# Patient Record
Sex: FEMALE | Race: WHITE | NOT HISPANIC OR LATINO | Employment: FULL TIME | URBAN - METROPOLITAN AREA
[De-identification: names, ages, dates, MRNs, and addresses within clinical notes are randomized per-mention and may not be internally consistent; named-entity substitution may affect disease eponyms.]

---

## 2017-01-10 ENCOUNTER — HOSPITAL ENCOUNTER (EMERGENCY)
Facility: MEDICAL CENTER | Age: 27
End: 2017-01-10

## 2017-01-10 VITALS
HEART RATE: 103 BPM | HEIGHT: 71 IN | WEIGHT: 149.91 LBS | DIASTOLIC BLOOD PRESSURE: 63 MMHG | RESPIRATION RATE: 16 BRPM | BODY MASS INDEX: 20.99 KG/M2 | SYSTOLIC BLOOD PRESSURE: 123 MMHG | TEMPERATURE: 99 F | OXYGEN SATURATION: 100 %

## 2017-01-10 LAB
APPEARANCE UR: CLEAR
BILIRUB UR QL STRIP.AUTO: NEGATIVE
COLOR UR: ABNORMAL
EPI CELLS #/AREA URNS HPF: NORMAL /HPF
GLUCOSE UR STRIP.AUTO-MCNC: NEGATIVE MG/DL
HCG UR QL: NEGATIVE
KETONES UR STRIP.AUTO-MCNC: NEGATIVE MG/DL
LEUKOCYTE ESTERASE UR QL STRIP.AUTO: NEGATIVE
MICRO URNS: ABNORMAL
MUCOUS THREADS #/AREA URNS HPF: NORMAL /HPF
NITRITE UR QL STRIP.AUTO: NEGATIVE
PH UR STRIP.AUTO: <5 [PH]
PROT UR QL STRIP: NEGATIVE MG/DL
RBC # URNS HPF: NORMAL /HPF
RBC UR QL AUTO: ABNORMAL
SP GR UR REFRACTOMETRY: 1.01
SP GR UR STRIP.AUTO: 1.01

## 2017-01-10 PROCEDURE — 81001 URINALYSIS AUTO W/SCOPE: CPT

## 2017-01-10 PROCEDURE — 302449 STATCHG TRIAGE ONLY (STATISTIC)

## 2017-01-10 PROCEDURE — 81025 URINE PREGNANCY TEST: CPT

## 2017-01-10 ASSESSMENT — PAIN SCALES - GENERAL: PAINLEVEL_OUTOF10: 6

## 2017-01-11 NOTE — ED NOTES
"Chief Complaint   Patient presents with   • Vaginal Bleeding     LMP 11/28/16. Negative pregnancy test 3-4 days ago. Pt states \"I think I'm having a miscarriage. I started cramping today and having bleeding, I was shaking, I had to go home from work. Pt states \"I passed a clot.\" Pt never had a confirmed pregnancy.   • Cramping     Pt ambulatory to triage with above complaints. Slightly tachycardic, otherwise VSS. Pt given urine cup for sample. Educated on triage process, placed in lobby, instructed to notify staff of any issues.    /63 mmHg  Pulse 103  Temp(Src) 37.2 °C (99 °F) (Temporal)  Resp 16  Ht 1.803 m (5' 11\")  Wt 68 kg (149 lb 14.6 oz)  BMI 20.92 kg/m2  SpO2 100%    "

## 2017-09-14 ENCOUNTER — OFFICE VISIT (OUTPATIENT)
Dept: MEDICAL GROUP | Facility: MEDICAL CENTER | Age: 27
End: 2017-09-14
Payer: COMMERCIAL

## 2017-09-14 VITALS
HEART RATE: 100 BPM | DIASTOLIC BLOOD PRESSURE: 60 MMHG | RESPIRATION RATE: 16 BRPM | OXYGEN SATURATION: 98 % | BODY MASS INDEX: 19.6 KG/M2 | TEMPERATURE: 98.4 F | HEIGHT: 71 IN | WEIGHT: 140 LBS | SYSTOLIC BLOOD PRESSURE: 106 MMHG

## 2017-09-14 DIAGNOSIS — Z00.00 WELLNESS EXAMINATION: ICD-10-CM

## 2017-09-14 DIAGNOSIS — Z34.90 PREGNANCY, UNSPECIFIED GESTATIONAL AGE: ICD-10-CM

## 2017-09-14 PROCEDURE — 99385 PREV VISIT NEW AGE 18-39: CPT | Performed by: PHYSICIAN ASSISTANT

## 2017-09-14 ASSESSMENT — PATIENT HEALTH QUESTIONNAIRE - PHQ9: CLINICAL INTERPRETATION OF PHQ2 SCORE: 0

## 2017-09-14 NOTE — ASSESSMENT & PLAN NOTE
Positive home pregnancy test on 7/28, LMP 6/15, estimates she is about 12 weeks pregnant. Hasn't seen OB yet. No prior pregnancy. Having nausea, vomiting and fatigue. Trying to take daily MVI. Not taking any meds - was on singulair, neurontin, flonase - stopped when she found out she was pregnant. . She and  moved from [place] to [place] last December. 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 No drugs. Doesn't drink alcohol. Doesn't smoke. Has hx of HSV but no other hx of STD. No known diabetes, heart disease, immune disease.

## 2017-09-14 NOTE — PROGRESS NOTES
"Chief Complaint   Patient presents with   • Establish Care   • Routine Prenatal Visit     labs, pos home test on 7/28       HISTORY OF THE PRESENT ILLNESS: This is a 26 y.o. female new patient to our practice. This pleasant patient is here today to establish care and have general \"check up\".    Pregnancy  Positive home pregnancy test on 7/28, LMP 6/15, estimates she is about 12 weeks pregnant. Hasn't seen OB yet. No prior pregnancy. Having nausea, vomiting and fatigue. Trying to take daily MVI. Not taking any meds - was on singulair, neurontin, flonase - stopped when she found out she was pregnant. . She and  moved from [place] to [place] last December. 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 No drugs. Doesn't drink alcohol. Doesn't smoke. Has hx of HSV but no other hx of STD. No known diabetes, heart disease, immune disease.      Past Medical History:   Diagnosis Date   • Herpes        No past surgical history on file.    No family status information on file.   History reviewed. No pertinent family history.    Social History   Substance Use Topics   • Smoking status: Never Smoker   • Smokeless tobacco: Never Used   • Alcohol use No       Allergies: Ciprofloxacin; Morphine; Sulfa drugs; Sulfamethoxazole w-trimethoprim; Sulfate; Sulfites; Ventolin [albuterol]; and Amoxicillin    No current [facility]-ordered outpatient prescriptions on file.     No current [facility]-ordered facility-administered medications on file.        Review of Systems   Constitutional: Negative for fever, chills,    HENT: Negative for ear pain, nosebleeds, congestion, sore throat and neck pain.    Eyes: Negative for blurred vision.   Respiratory: Negative for cough, sputum production, shortness of breath and wheezing.    Cardiovascular: Negative for chest pain, palpitations, orthopnea and leg swelling.   Genitourinary: Negative for dysuria, urgency and frequency. " "  Musculoskeletal: Negative for myalgias, back pain and joint pain.   Skin: Negative for rash and itching.   Neurological: Negative for dizziness, tingling, tremors, sensory change, focal weakness and headaches.   Endo/Heme/Allergies: Does not bruise/bleed easily.   Psychiatric/Behavioral: Negative for depression, anxiety, or memory loss.     All other systems reviewed and are negative except as in HPI.    Exam: Blood pressure 106/60, pulse 100, temperature 36.9 °C (98.4 °F), resp. rate 16, height 1.803 m (5' 11\"), weight 63.5 kg (140 lb), SpO2 98 %.  General: Normal appearing. No distress.  Neck: Supple without JVD or bruit. Thyroid is not enlarged.  Pulmonary: Clear to ausculation.  Normal effort. No rales, ronchi, or wheezing.  Cardiovascular: Regular rate and rhythm without murmur. Carotid and radial pulses are intact and equal bilaterally.  Neurologic: Grossly nonfocal  Lymph: No cervical, supraclavicular lymph nodes are palpable  Skin: Warm and dry.  No obvious lesions.  Musculoskeletal: Normal gait. No extremity cyanosis, clubbing, or edema.  Psych: Normal mood and affect. Alert and oriented x3. Judgment and insight is normal.    Assessment/Plan  1. Pregnancy, unspecified gestational age  HCG QUANTITATIVE   2. Wellness examination  CBC WITH DIFFERENTIAL    COMP METABOLIC PANEL    TSH WITH REFLEX TO FT4    HIV ANTIBODIES    RPR    URINALYSIS     Will f/u with OB. F/u prn  "

## 2017-10-09 ENCOUNTER — HOSPITAL ENCOUNTER (OUTPATIENT)
Facility: MEDICAL CENTER | Age: 27
End: 2017-10-09
Attending: OBSTETRICS & GYNECOLOGY
Payer: COMMERCIAL

## 2017-10-09 ENCOUNTER — GYNECOLOGY VISIT (OUTPATIENT)
Dept: OBGYN | Facility: CLINIC | Age: 27
End: 2017-10-09
Payer: COMMERCIAL

## 2017-10-09 VITALS
SYSTOLIC BLOOD PRESSURE: 104 MMHG | HEIGHT: 71 IN | WEIGHT: 142 LBS | BODY MASS INDEX: 19.88 KG/M2 | DIASTOLIC BLOOD PRESSURE: 64 MMHG

## 2017-10-09 DIAGNOSIS — Z34.90 PREGNANCY, UNSPECIFIED GESTATIONAL AGE: ICD-10-CM

## 2017-10-09 DIAGNOSIS — N93.8 DUB (DYSFUNCTIONAL UTERINE BLEEDING): ICD-10-CM

## 2017-10-09 LAB — IN CLINIC OB SCAN: NORMAL

## 2017-10-09 PROCEDURE — 88175 CYTOPATH C/V AUTO FLUID REDO: CPT

## 2017-10-09 PROCEDURE — 87491 CHLMYD TRACH DNA AMP PROBE: CPT

## 2017-10-09 PROCEDURE — 87591 N.GONORRHOEAE DNA AMP PROB: CPT

## 2017-10-09 PROCEDURE — 76830 TRANSVAGINAL US NON-OB: CPT | Performed by: OBSTETRICS & GYNECOLOGY

## 2017-10-09 PROCEDURE — 99203 OFFICE O/P NEW LOW 30 MIN: CPT | Mod: 25 | Performed by: OBSTETRICS & GYNECOLOGY

## 2017-10-09 RX ORDER — MONTELUKAST SODIUM 10 MG/1
10 TABLET ORAL DAILY
COMMUNITY
End: 2018-07-05

## 2017-10-09 RX ORDER — MONTELUKAST SODIUM 10 MG/1
10 TABLET ORAL DAILY
Qty: 30 TAB | Refills: 12 | Status: SHIPPED | OUTPATIENT
Start: 2017-10-09 | End: 2019-03-14

## 2017-10-09 NOTE — PROGRESS NOTES
"Bobbi Douglas,  26 y.o.  female presents today with a C/O of :amenorrhea. Pt   Patient's last menstrual period was 06/15/2017.       Subjective : Nausea/Vomiting: Sometimes:  Abdominal /pelvic cramping : No :   vaginal bleeding:No  Complaining of sinus congestion and allergies       GYN ROS:  normal menses, no abnormal bleeding, pelvic pain or discharge, no breast pain or new or enlarging lumps on self exam      Past Medical History:   Diagnosis Date   • Herpes        No past surgical history on file.    Current Birth control:  none    OB History    Para Term  AB Living   1             SAB TAB Ectopic Molar Multiple Live Births                    # Outcome Date GA Lbr Nathen/2nd Weight Sex Delivery Anes PTL Lv   1 Current                       Allergy:      Ciprofloxacin; Morphine; Sulfa drugs; Sulfamethoxazole w-trimethoprim; Sulfate; Sulfites; Ventolin [albuterol]; and Amoxicillin    Exam;    /64   Ht 1.803 m (5' 11\")   Wt 64.4 kg (142 lb)   LMP 06/15/2017   Breastfeeding? No   BMI 19.80 kg/m²   Well-developed well-nourished female in no apparent distress  Heart regular rate and rhythm  Lungs clear to auscultation bilaterally  Breasts bilaterally normal with no dominant masses  Abdomen is soft and nontender    Normal external female genitalia  Cervix is closed thick and high  Uterus  16 centimeters  Adnexa are bilaterally nontender with no dominant masses    Lab.    No results found for this or any previous visit (from the past 336 hour(s)).  Ultrasound :     Second/third trimester findings: BPD: consistent with 15 weeks and 5 days, Placenta localization: anterior and US GUERA: 3/28/18  Probe type: abdominal  Ultrasound was performed and read by me      Assessment:    Intrauterine gestation at 16 weeks and 4 /7 days, with EDC 3/22/18    Plan:  4 weeks  Prenatal labs are ordered  Quad screen and us      "

## 2017-10-09 NOTE — LETTER
October 9, 2017       Patient: Bobbi Douglas   YOB: 1990   Date of Visit: 10/9/2017         To Whom It May Concern:    It is my medical opinion that Bobbi Douglas has an expected delivery date of 3/22/2018.    If you have any questions or concerns, please don't hesitate to call [phone]          Sincerely,          Bibi Weston M.D.  Electronically Signed

## 2017-10-10 LAB
C TRACH DNA GENITAL QL NAA+PROBE: NEGATIVE
CYTOLOGY REG CYTOL: NORMAL
N GONORRHOEA DNA GENITAL QL NAA+PROBE: NEGATIVE
SPECIMEN SOURCE: NORMAL

## 2017-10-28 ENCOUNTER — HOSPITAL ENCOUNTER (OUTPATIENT)
Dept: LAB | Facility: MEDICAL CENTER | Age: 27
End: 2017-10-28
Attending: OBSTETRICS & GYNECOLOGY
Payer: COMMERCIAL

## 2017-10-28 DIAGNOSIS — Z34.90 PREGNANCY, UNSPECIFIED GESTATIONAL AGE: ICD-10-CM

## 2017-10-28 LAB
ABO GROUP BLD: NORMAL
APPEARANCE UR: ABNORMAL
BACTERIA #/AREA URNS HPF: ABNORMAL /HPF
BASOPHILS # BLD AUTO: 0.7 % (ref 0–1.8)
BASOPHILS # BLD: 0.06 K/UL (ref 0–0.12)
BILIRUB UR QL STRIP.AUTO: NEGATIVE
BLD GP AB SCN SERPL QL: NORMAL
COLOR UR: YELLOW
CULTURE IF INDICATED INDCX: YES UA CULTURE
EOSINOPHIL # BLD AUTO: 0.12 K/UL (ref 0–0.51)
EOSINOPHIL NFR BLD: 1.5 % (ref 0–6.9)
EPI CELLS #/AREA URNS HPF: ABNORMAL /HPF
ERYTHROCYTE [DISTWIDTH] IN BLOOD BY AUTOMATED COUNT: 39.4 FL (ref 35.9–50)
GLUCOSE UR STRIP.AUTO-MCNC: NEGATIVE MG/DL
HBV SURFACE AG SER QL: NEGATIVE
HCT VFR BLD AUTO: 35.7 % (ref 37–47)
HGB BLD-MCNC: 12.3 G/DL (ref 12–16)
HIV 1+2 AB+HIV1 P24 AG SERPL QL IA: NON REACTIVE
HYALINE CASTS #/AREA URNS LPF: ABNORMAL /LPF
IMM GRANULOCYTES # BLD AUTO: 0.02 K/UL (ref 0–0.11)
IMM GRANULOCYTES NFR BLD AUTO: 0.2 % (ref 0–0.9)
KETONES UR STRIP.AUTO-MCNC: NEGATIVE MG/DL
LEUKOCYTE ESTERASE UR QL STRIP.AUTO: ABNORMAL
LYMPHOCYTES # BLD AUTO: 1.84 K/UL (ref 1–4.8)
LYMPHOCYTES NFR BLD: 22.7 % (ref 22–41)
MCH RBC QN AUTO: 30.8 PG (ref 27–33)
MCHC RBC AUTO-ENTMCNC: 34.5 G/DL (ref 33.6–35)
MCV RBC AUTO: 89.3 FL (ref 81.4–97.8)
MICRO URNS: ABNORMAL
MONOCYTES # BLD AUTO: 0.37 K/UL (ref 0–0.85)
MONOCYTES NFR BLD AUTO: 4.6 % (ref 0–13.4)
NEUTROPHILS # BLD AUTO: 5.71 K/UL (ref 2–7.15)
NEUTROPHILS NFR BLD: 70.3 % (ref 44–72)
NITRITE UR QL STRIP.AUTO: NEGATIVE
NRBC # BLD AUTO: 0 K/UL
NRBC BLD AUTO-RTO: 0 /100 WBC
PH UR STRIP.AUTO: 5.5 [PH]
PLATELET # BLD AUTO: 194 K/UL (ref 164–446)
PMV BLD AUTO: 11.3 FL (ref 9–12.9)
PROT UR QL STRIP: NEGATIVE MG/DL
RBC # BLD AUTO: 4 M/UL (ref 4.2–5.4)
RBC # URNS HPF: ABNORMAL /HPF
RBC UR QL AUTO: NEGATIVE
RH BLD: NORMAL
RUBV AB SER QL: 172.1 IU/ML
SP GR UR STRIP.AUTO: 1.02
TREPONEMA PALLIDUM IGG+IGM AB [PRESENCE] IN SERUM OR PLASMA BY IMMUNOASSAY: NON REACTIVE
UROBILINOGEN UR STRIP.AUTO-MCNC: 0.2 MG/DL
WBC # BLD AUTO: 8.1 K/UL (ref 4.8–10.8)
WBC #/AREA URNS HPF: ABNORMAL /HPF

## 2017-10-28 PROCEDURE — 87389 HIV-1 AG W/HIV-1&-2 AB AG IA: CPT

## 2017-10-28 PROCEDURE — 86780 TREPONEMA PALLIDUM: CPT

## 2017-10-28 PROCEDURE — 86762 RUBELLA ANTIBODY: CPT

## 2017-10-28 PROCEDURE — 81511 FTL CGEN ABNOR FOUR ANAL: CPT

## 2017-10-28 PROCEDURE — 85025 COMPLETE CBC W/AUTO DIFF WBC: CPT

## 2017-10-28 PROCEDURE — 81001 URINALYSIS AUTO W/SCOPE: CPT

## 2017-10-28 PROCEDURE — 86900 BLOOD TYPING SEROLOGIC ABO: CPT

## 2017-10-28 PROCEDURE — 86901 BLOOD TYPING SEROLOGIC RH(D): CPT

## 2017-10-28 PROCEDURE — 87340 HEPATITIS B SURFACE AG IA: CPT

## 2017-10-28 PROCEDURE — 36415 COLL VENOUS BLD VENIPUNCTURE: CPT

## 2017-10-28 PROCEDURE — 86850 RBC ANTIBODY SCREEN: CPT

## 2017-10-28 PROCEDURE — 87086 URINE CULTURE/COLONY COUNT: CPT

## 2017-10-30 LAB
BACTERIA UR CULT: NORMAL
SIGNIFICANT IND 70042: NORMAL
SOURCE SOURCE: NORMAL

## 2017-10-31 LAB
# FETUSES US: NORMAL
AFP MOM SERPL: 0.87
AFP SERPL-MCNC: 46 NG/ML
AGE - REPORTED: 27.2 YR
GA METHOD: NORMAL
GA: 19.29 WEEKS
HCG MOM SERPL: 0.49
HCG SERPL-ACNC: NORMAL IU/L
IDDM PATIENT QL: NO
INHIBIN A MOM SERPL: 0.81
INHIBIN A SERPL-MCNC: 137 PG/ML
INTEGRATED SCN PATIENT-IMP: NORMAL
PATHOLOGY STUDY: NORMAL
U ESTRIOL MOM SERPL: 1.04
U ESTRIOL SERPL-MCNC: 2.02 NG/ML

## 2017-11-06 ENCOUNTER — HOSPITAL ENCOUNTER (OUTPATIENT)
Dept: RADIOLOGY | Facility: MEDICAL CENTER | Age: 27
End: 2017-11-06
Attending: OBSTETRICS & GYNECOLOGY
Payer: COMMERCIAL

## 2017-11-06 DIAGNOSIS — Z34.90 PREGNANCY, UNSPECIFIED GESTATIONAL AGE: ICD-10-CM

## 2017-11-06 PROCEDURE — 76805 OB US >/= 14 WKS SNGL FETUS: CPT

## 2017-11-14 ENCOUNTER — INITIAL PRENATAL (OUTPATIENT)
Dept: OBGYN | Facility: CLINIC | Age: 27
End: 2017-11-14
Payer: COMMERCIAL

## 2017-11-14 VITALS — SYSTOLIC BLOOD PRESSURE: 122 MMHG | DIASTOLIC BLOOD PRESSURE: 62 MMHG | BODY MASS INDEX: 20.78 KG/M2 | WEIGHT: 149 LBS

## 2017-11-14 DIAGNOSIS — Z34.02 ENCOUNTER FOR SUPERVISION OF NORMAL FIRST PREGNANCY IN SECOND TRIMESTER: ICD-10-CM

## 2017-11-14 PROCEDURE — 59401 PR NEW OB VISIT: CPT | Performed by: OBSTETRICS & GYNECOLOGY

## 2017-11-14 NOTE — PROGRESS NOTES
Subjective:      Bobbi Douglas is a 26 y.o. female who presents with No chief complaint on file.        CC: NOB    HPI: 25 yo  @ 21 + 5/7 wks by lmp 6/15/17 consistent with 15 + 5/7 wk US, GUERA 3/22/18 presents for NOB visit.  No complaints today.  + fetal movement.  No cramping or bleeding.      complications:  1. Genital HSV  2. Multiple allergies.    ROS REVIEW OF SYSTEMS:    Pertinent positives and negatives mentioned in HPI.    All other systems reviewed and are negative or noncontributory.       Objective:     /62   Wt 67.6 kg (149 lb)   LMP 06/15/2017   BMI 20.78 kg/m²      Physical Exam    FHTs 140s  Fundal height 22 cm          Assessment/Plan:     1. Supervision normal pregnancy - prenatal labs and US reviewed.  Continue PNV. Flu shot recommended - pt will get it at work.  2. Genital HSV - plan for prophylaxis at 36 wks.     F/U 4 weeks.

## 2017-12-12 ENCOUNTER — ROUTINE PRENATAL (OUTPATIENT)
Dept: OBGYN | Facility: CLINIC | Age: 27
End: 2017-12-12
Payer: COMMERCIAL

## 2017-12-12 VITALS — WEIGHT: 156 LBS | BODY MASS INDEX: 21.76 KG/M2 | DIASTOLIC BLOOD PRESSURE: 70 MMHG | SYSTOLIC BLOOD PRESSURE: 118 MMHG

## 2017-12-12 DIAGNOSIS — Z34.02 ENCOUNTER FOR SUPERVISION OF NORMAL FIRST PREGNANCY IN SECOND TRIMESTER: ICD-10-CM

## 2017-12-12 DIAGNOSIS — Z34.82 PRENATAL CARE, SUBSEQUENT PREGNANCY IN SECOND TRIMESTER: ICD-10-CM

## 2017-12-12 PROCEDURE — 90040 PR PRENATAL FOLLOW UP: CPT | Performed by: OBSTETRICS & GYNECOLOGY

## 2017-12-12 NOTE — PROGRESS NOTES
Bobbi Douglas is a 26 y.o.  at 25w5d here today for obstetrical visit.  Patient is without complaints.    She reports good fetal movement.  She denies vaginal bleeding.  She denies rupture of membranes.  She denies contractions.     has Food allergy; Genital herpes simplex; and Pregnancy on her problem list.        A/P IUP at 25w5d  AFP done  1 hour glucola done  Rhogam done  GBS     F/U in 4 weeks

## 2018-01-10 ENCOUNTER — ROUTINE PRENATAL (OUTPATIENT)
Dept: OBGYN | Facility: CLINIC | Age: 28
End: 2018-01-10
Payer: COMMERCIAL

## 2018-01-10 VITALS — SYSTOLIC BLOOD PRESSURE: 124 MMHG | BODY MASS INDEX: 23.57 KG/M2 | DIASTOLIC BLOOD PRESSURE: 62 MMHG | WEIGHT: 169 LBS

## 2018-01-10 DIAGNOSIS — Z34.02 ENCOUNTER FOR SUPERVISION OF NORMAL FIRST PREGNANCY IN SECOND TRIMESTER: ICD-10-CM

## 2018-01-10 PROCEDURE — 90040 PR PRENATAL FOLLOW UP: CPT | Performed by: OBSTETRICS & GYNECOLOGY

## 2018-01-10 NOTE — PROGRESS NOTES
Pt here for OB F/V. Good fetal movement. Denies LOF, VB.  28 week labs not done- will reprint lab slips  Declines flu shot  States she can get tdap at work.

## 2018-01-10 NOTE — PROGRESS NOTES
S: Pt presents for routine OB follow up.  Fetal movement: positive  Vaginal Bleeding:negative  Contractions: negative  Loss of Fluid: negative  Questions answered.  Has diarrhea past 3d, no f/c, no N/V.  Has not done 1hr GTT yet, gave pt another lab order today, states lost paper while moving    Plans to breast feed. Rx for pump given today  Has not pre-registered, gave pt website info  Pediatrician undecided, meeting one today    O: /62   Wt 76.7 kg (169 lb)   LMP 06/15/2017   BMI 23.57 kg/m²   Patients' weight gain, fluid intake and exercise level discussed.  Vitals, fundal height , fetal position, and FHR reviewed on flowsheet    Lab:No results found for this or any previous visit (from the past 336 hour(s)).    A/P:  27 y.o.  at 29w6d presents for routine obstetric follow-up.  Size equals dates and/or scan    1.  Continue prenatal vitamins.  2.  Fetal kick counts.  3.  Exercise at least 30 minutes daily.  4.  Increase water intake.  5.  Labor precautions educated.  6.  Follow-up in 2 weeks.  7.  Imodium and fluids prn diarrhea, discussed meds safe in pregnancy  8.  Do 1hr GTT and labs ASAP

## 2018-01-20 ENCOUNTER — HOSPITAL ENCOUNTER (OUTPATIENT)
Dept: LAB | Facility: MEDICAL CENTER | Age: 28
End: 2018-01-20
Attending: OBSTETRICS & GYNECOLOGY
Payer: COMMERCIAL

## 2018-01-20 DIAGNOSIS — Z34.82 PRENATAL CARE, SUBSEQUENT PREGNANCY IN SECOND TRIMESTER: ICD-10-CM

## 2018-01-20 LAB
GLUCOSE 1H P 50 G GLC PO SERPL-MCNC: 152 MG/DL (ref 70–139)
HCT VFR BLD AUTO: 33.4 % (ref 37–47)
HGB BLD-MCNC: 11.6 G/DL (ref 12–16)
TREPONEMA PALLIDUM IGG+IGM AB [PRESENCE] IN SERUM OR PLASMA BY IMMUNOASSAY: NON REACTIVE

## 2018-01-20 PROCEDURE — 85018 HEMOGLOBIN: CPT

## 2018-01-20 PROCEDURE — 82950 GLUCOSE TEST: CPT

## 2018-01-20 PROCEDURE — 86780 TREPONEMA PALLIDUM: CPT

## 2018-01-20 PROCEDURE — 85014 HEMATOCRIT: CPT

## 2018-01-20 PROCEDURE — 36415 COLL VENOUS BLD VENIPUNCTURE: CPT

## 2018-01-26 ENCOUNTER — ROUTINE PRENATAL (OUTPATIENT)
Dept: OBGYN | Facility: CLINIC | Age: 28
End: 2018-01-26
Payer: COMMERCIAL

## 2018-01-26 VITALS — DIASTOLIC BLOOD PRESSURE: 68 MMHG | BODY MASS INDEX: 22.59 KG/M2 | SYSTOLIC BLOOD PRESSURE: 124 MMHG | WEIGHT: 162 LBS

## 2018-01-26 DIAGNOSIS — R73.02 IMPAIRED GLUCOSE TOLERANCE (ORAL): ICD-10-CM

## 2018-01-26 PROBLEM — Z34.03 ENCOUNTER FOR SUPERVISION OF NORMAL FIRST PREGNANCY IN THIRD TRIMESTER: Status: ACTIVE | Noted: 2017-09-14

## 2018-01-26 PROCEDURE — 90040 PR PRENATAL FOLLOW UP: CPT | Performed by: OBSTETRICS & GYNECOLOGY

## 2018-01-26 RX ORDER — EPINEPHRINE 0.3 MG/.3ML
0.3 INJECTION SUBCUTANEOUS ONCE
Qty: 0.3 ML | Refills: 0 | Status: SHIPPED | OUTPATIENT
Start: 2018-01-26 | End: 2018-01-26

## 2018-01-26 NOTE — PROGRESS NOTES
S: Pt presents for routine OB follow up.  Fetal movement: positive  Vaginal Bleeding:negative  Contractions: negative  Loss of Fluid: negative  Questions answered.      O: /68   Wt 73.5 kg (162 lb)   LMP 06/15/2017   BMI 22.59 kg/m²   Patients' weight gain, fluid intake and exercise level discussed.  Vitals, fundal height , fetal position, and FHR reviewed on flowsheet    Lab:  Recent Results (from the past 336 hour(s))   GLUCOSE 1HR GESTATIONAL    Collection Time: 18 11:47 AM   Result Value Ref Range    Glucose, Post Dose 152 (H) 70 - 139 mg/dL   HCT    Collection Time: 18 11:47 AM   Result Value Ref Range    Hematocrit 33.4 (L) 37.0 - 47.0 %   HGB    Collection Time: 18 11:47 AM   Result Value Ref Range    Hemoglobin 11.6 (L) 12.0 - 16.0 g/dL   T.PALLIDUM AB EIA    Collection Time: 18 11:47 AM   Result Value Ref Range    Syphilis, Treponemal Qual Non Reactive Non Reactive       A/P:  27 y.o.  at 32w1d presents for routine obstetric follow-up.  Size equals dates and/or scan  Abnl 1hr GTT of 152    1.  Continue prenatal vitamins.  2.  Fetal kick counts.  3.  Exercise at least 30 minutes daily.  4.  Increase water intake.  5.  Labor precautions educated.  6.  Given order for 3 hour GTT  7.  Plans to get tdap next week at work, declines today  6.  Follow-up in 2 weeks.

## 2018-01-26 NOTE — PROGRESS NOTES
Pt here for OB F/V. Good fetal movement. Denies LOF, VB.  Needs 3 hour glucose  Kick count sheet given

## 2018-01-26 NOTE — LETTER
"Count Your Baby's Movements  Another step to a healthy delivery    A Epic Dress Re Test             Dept: [phone]    How Many Weeks Pregnant? 32W1D    Date to Begin Counting: *1/26/18              How to use this chart    One way for your physician to keep track of your baby's health is by knowing how often the baby moves (or \"kicks\") in your womb.  You can help your physician to do this by using this chart every day.    Every day, you should see how many hours it takes for your baby to move 10 times.  Start in the morning, as soon as you get up.    · First, write down the time your baby moves until you get to 10.  · Check off one box every time your baby moves until you get to 10.  · Write down the time you finished counting in the last column.  · Total how long it took to count up all 10 movements.  · Finally, fill in the box that shows how long this took.  After counting 10 movements, you no longer have to count any more that day.  The next morning, just start counting again as soon as you get up.    What should you call a \"movement\"?  It is hard to say, because it will feel different from one mother to another and from one pregnancy to the next.  The important thing is that you count the movements the same way throughout your pregnancy.  If you have more questions, you should ask your physician.    Count carefully every day!  SAMPLE:  Week 28    How many hours did it take to feel 10 movements?       Start  Time     1     2     3     4     5     6     7     8     9     10   Finish Time   Mon 8:20 ·  ·  ·  ·  ·  ·  ·  ·  ·  ·  11:40   Tue Wed Thu Fri               Sat               Sun                 IMPORTANT: You should contact your physician if it takes more than two hours for you to feel 10 movements.  Each morning, write down the time and start to count the movements of your baby.  Keep track by checking off one box every time you feel one movement.  When you " "have felt 10 \"kicks\", write down the time you finished counting in the last column.  Then fill in the   box (over the check jose) for the number of hours it took.  Be sure to read the complete instructions on the previous page.            "

## 2018-02-08 ENCOUNTER — ROUTINE PRENATAL (OUTPATIENT)
Dept: OBGYN | Facility: CLINIC | Age: 28
End: 2018-02-08
Payer: COMMERCIAL

## 2018-02-08 VITALS — BODY MASS INDEX: 18.55 KG/M2 | DIASTOLIC BLOOD PRESSURE: 78 MMHG | WEIGHT: 133 LBS | SYSTOLIC BLOOD PRESSURE: 120 MMHG

## 2018-02-08 DIAGNOSIS — R73.02 IMPAIRED GLUCOSE TOLERANCE (ORAL): ICD-10-CM

## 2018-02-08 DIAGNOSIS — Z34.03 ENCOUNTER FOR SUPERVISION OF NORMAL FIRST PREGNANCY IN THIRD TRIMESTER: ICD-10-CM

## 2018-02-08 PROCEDURE — 90040 PR PRENATAL FOLLOW UP: CPT | Performed by: OBSTETRICS & GYNECOLOGY

## 2018-02-08 NOTE — PROGRESS NOTES
OB visit. Doing well. Active Fm.No bleeding,leaking fluid or pain/cramping. Had Tdap at work(pharmacy). Leg cramps, discussed po fluids and K+.

## 2018-02-09 NOTE — PROGRESS NOTES
OB Followup;    34w0d    Patient Active Problem List    Diagnosis Date Noted   • Impaired glucose tolerance (oral) 2018   • Encounter for supervision of normal first pregnancy in third trimester 2017   • Food allergy 2016   • Genital herpes simplex 2016       Vitals:    18 1348   BP: 120/78   Weight: 60.3 kg (133 lb)       Patient presents for followup of OB care. Currently doing well . Good fetal movement no leakage of fluid no contractions or vaginal bleeding        Size equals dates, normal fetal heart rate      Patient has not performed through a glucose tolerance test discussed the risks of untreated and undiagnosed diabetes of pregnancy including risk of NICU admission for the infant possible brachial plexus injury possible increased risk for  section    Patient states she will perform retrograde glucose tolerance test this week    Labor precautions given  Increase oral hydration  Discussed proper weight gain during pregnancy  Continue prenatal vitamins  Increase water intake  Discussed proper exercise and walking  Discussed proper shoe style utilization during pregnancy  Reviewed our group's policy on prenatal visits with all providers in the group to ensure a healthy pregnancy and delivery    Followup in  2 weeks

## 2018-02-10 ENCOUNTER — HOSPITAL ENCOUNTER (OUTPATIENT)
Dept: LAB | Facility: MEDICAL CENTER | Age: 28
End: 2018-02-10
Attending: OBSTETRICS & GYNECOLOGY
Payer: COMMERCIAL

## 2018-02-10 DIAGNOSIS — R73.02 IMPAIRED GLUCOSE TOLERANCE (ORAL): ICD-10-CM

## 2018-02-10 LAB
GLUCOSE 1H P CHAL SERPL-MCNC: 114 MG/DL (ref 65–180)
GLUCOSE 2H P CHAL SERPL-MCNC: 141 MG/DL (ref 65–155)
GLUCOSE 3H P CHAL SERPL-MCNC: 75 MG/DL (ref 65–140)
GLUCOSE BS SERPL-MCNC: 81 MG/DL (ref 65–95)

## 2018-02-10 PROCEDURE — 36415 COLL VENOUS BLD VENIPUNCTURE: CPT

## 2018-02-10 PROCEDURE — 82951 GLUCOSE TOLERANCE TEST (GTT): CPT

## 2018-02-10 PROCEDURE — 82952 GTT-ADDED SAMPLES: CPT

## 2018-02-14 ENCOUNTER — TELEPHONE (OUTPATIENT)
Dept: OBGYN | Facility: CLINIC | Age: 28
End: 2018-02-14

## 2018-02-14 NOTE — TELEPHONE ENCOUNTER
----- Message from Dari Lucia M.D. sent at 2/14/2018 12:56 PM PST -----  Please inform the patient her 3 hour glucose test was normal, she does not have diabetes

## 2018-02-22 ENCOUNTER — ROUTINE PRENATAL (OUTPATIENT)
Dept: OBGYN | Facility: CLINIC | Age: 28
End: 2018-02-22
Payer: COMMERCIAL

## 2018-02-22 ENCOUNTER — HOSPITAL ENCOUNTER (OUTPATIENT)
Facility: MEDICAL CENTER | Age: 28
End: 2018-02-22
Attending: OBSTETRICS & GYNECOLOGY
Payer: COMMERCIAL

## 2018-02-22 VITALS — WEIGHT: 168 LBS | SYSTOLIC BLOOD PRESSURE: 118 MMHG | BODY MASS INDEX: 23.43 KG/M2 | DIASTOLIC BLOOD PRESSURE: 80 MMHG

## 2018-02-22 DIAGNOSIS — Z34.03 ENCOUNTER FOR SUPERVISION OF NORMAL FIRST PREGNANCY IN THIRD TRIMESTER: ICD-10-CM

## 2018-02-22 DIAGNOSIS — R73.02 IMPAIRED GLUCOSE TOLERANCE (ORAL): ICD-10-CM

## 2018-02-22 PROCEDURE — 87653 STREP B DNA AMP PROBE: CPT

## 2018-02-22 PROCEDURE — 90040 PR PRENATAL FOLLOW UP: CPT | Performed by: OBSTETRICS & GYNECOLOGY

## 2018-02-22 NOTE — PROGRESS NOTES
Bobbi Douglas is a 27 y.o.  at 36w0d here today for obstetrical visit.  Patient is without complaints.    She reports good fetal movement.  She denies vaginal bleeding.  She denies rupture of membranes.  She denies contractions.     has Food allergy; Genital herpes simplex; Encounter for supervision of normal first pregnancy in third trimester; and Impaired glucose tolerance (oral) on her problem list.    Labor precautions are reviewed    A/P IUP at 36w0d  AFP done  1 hour glucola done  Rhogam a pos  GBS done today    F/U in 1 weeks

## 2018-02-24 LAB — GP B STREP DNA SPEC QL NAA+PROBE: NEGATIVE

## 2018-02-28 ENCOUNTER — ROUTINE PRENATAL (OUTPATIENT)
Dept: OBGYN | Facility: CLINIC | Age: 28
End: 2018-02-28
Payer: COMMERCIAL

## 2018-02-28 ENCOUNTER — APPOINTMENT (OUTPATIENT)
Dept: OTHER | Facility: IMAGING CENTER | Age: 28
End: 2018-02-28

## 2018-02-28 VITALS — DIASTOLIC BLOOD PRESSURE: 74 MMHG | SYSTOLIC BLOOD PRESSURE: 114 MMHG | BODY MASS INDEX: 23.57 KG/M2 | WEIGHT: 169 LBS

## 2018-02-28 DIAGNOSIS — Z34.03 ENCOUNTER FOR SUPERVISION OF NORMAL FIRST PREGNANCY IN THIRD TRIMESTER: ICD-10-CM

## 2018-02-28 DIAGNOSIS — R73.02 IMPAIRED GLUCOSE TOLERANCE (ORAL): ICD-10-CM

## 2018-02-28 PROCEDURE — 90040 PR PRENATAL FOLLOW UP: CPT | Performed by: OBSTETRICS & GYNECOLOGY

## 2018-02-28 NOTE — PROGRESS NOTES
Bobbi Douglas is a 27 y.o.  at 36w6d here today for obstetrical visit.  Patient is without complaints.    She reports excellent fetal movement.  She denies vaginal bleeding.  She denies rupture of membranes.  She denies contractions.     has Food allergy; Genital herpes simplex; Encounter for supervision of normal first pregnancy in third trimester; and Impaired glucose tolerance (oral) on her problem list.    acycloir 400 mg po bid    A/P IUP at 36w6d  AFP done  1 hour glucola done  Rhogam done  GBS done    F/U in 1 weeks

## 2018-03-08 ENCOUNTER — ROUTINE PRENATAL (OUTPATIENT)
Dept: OBGYN | Facility: CLINIC | Age: 28
End: 2018-03-08
Payer: COMMERCIAL

## 2018-03-08 VITALS — BODY MASS INDEX: 24.13 KG/M2 | WEIGHT: 173 LBS | SYSTOLIC BLOOD PRESSURE: 112 MMHG | DIASTOLIC BLOOD PRESSURE: 70 MMHG

## 2018-03-08 DIAGNOSIS — Z34.03 ENCOUNTER FOR SUPERVISION OF NORMAL FIRST PREGNANCY IN THIRD TRIMESTER: ICD-10-CM

## 2018-03-08 DIAGNOSIS — R73.02 IMPAIRED GLUCOSE TOLERANCE (ORAL): ICD-10-CM

## 2018-03-08 PROCEDURE — 90040 PR PRENATAL FOLLOW UP: CPT | Performed by: OBSTETRICS & GYNECOLOGY

## 2018-03-08 NOTE — PROGRESS NOTES
Bobbi Douglas is a 27 y.o.  at 38w0d here today for obstetrical visit.  Patient is without complaints.    She reports good fetal movement.  She denies vaginal bleeding.  She denies rupture of membranes.  She reports contractions.     has Food allergy; Genital herpes simplex; Encounter for supervision of normal first pregnancy in third trimester; and Impaired glucose tolerance (oral) on her problem list.    Patient states she has been having crampy abdominal pain off and on all evening. She is otherwise without complaints, good fetal movement    A/P IUP at 38w0d  AFP done  1 hour glucola done  Rhogam done  GBS done  Possible prodromal labor  F/U in 1 weeks

## 2018-03-11 ENCOUNTER — HOSPITAL ENCOUNTER (OUTPATIENT)
Facility: MEDICAL CENTER | Age: 28
End: 2018-03-12
Attending: OBSTETRICS & GYNECOLOGY | Admitting: OBSTETRICS & GYNECOLOGY
Payer: COMMERCIAL

## 2018-03-12 VITALS
TEMPERATURE: 98 F | HEART RATE: 90 BPM | SYSTOLIC BLOOD PRESSURE: 134 MMHG | RESPIRATION RATE: 16 BRPM | BODY MASS INDEX: 24.22 KG/M2 | HEIGHT: 71 IN | DIASTOLIC BLOOD PRESSURE: 79 MMHG | WEIGHT: 173 LBS

## 2018-03-12 PROCEDURE — 59025 FETAL NON-STRESS TEST: CPT | Performed by: OBSTETRICS & GYNECOLOGY

## 2018-03-12 NOTE — PROGRESS NOTES
27 y.o. , EDC 3/22=38w4d    Pt presents to L&D c/o painful UC q 5 minutes and loss of mucus plug. +FM, denies LOF/VB. /74, pt anxious and teary. SVE /-2 (was /-1 on 3/8). Call to Dr. Weston, okay to have pt walk or d/c home, pt elects to go home and return when ctx are stronger/closer together     D/c instructions d/w pt and FOB including when to return to L&D and labor precautions, express understanding, ambulatory off unit at 0102

## 2018-03-15 ENCOUNTER — ROUTINE PRENATAL (OUTPATIENT)
Dept: OBGYN | Facility: CLINIC | Age: 28
End: 2018-03-15
Payer: COMMERCIAL

## 2018-03-15 VITALS — DIASTOLIC BLOOD PRESSURE: 66 MMHG | BODY MASS INDEX: 24.13 KG/M2 | WEIGHT: 173 LBS | SYSTOLIC BLOOD PRESSURE: 102 MMHG

## 2018-03-15 DIAGNOSIS — Z34.03 ENCOUNTER FOR SUPERVISION OF NORMAL FIRST PREGNANCY IN THIRD TRIMESTER: ICD-10-CM

## 2018-03-15 DIAGNOSIS — R73.02 IMPAIRED GLUCOSE TOLERANCE (ORAL): ICD-10-CM

## 2018-03-15 PROCEDURE — 90040 PR PRENATAL FOLLOW UP: CPT | Performed by: OBSTETRICS & GYNECOLOGY

## 2018-03-15 NOTE — PROGRESS NOTES
S: Pt presents for routine OB follow up.  Fetal movement: positive  Vaginal Bleeding:negative  Contractions: negative  Loss of Fluid: negative  Questions answered.      O: /66   Wt 78.5 kg (173 lb)   LMP 06/15/2017   BMI 24.13 kg/m²   Patients' weight gain, fluid intake and exercise level discussed.  Vitals, fundal height , fetal position, and FHR reviewed on flowsheet    Lab:No results found for this or any previous visit (from the past 336 hour(s)).    A/P:  27 y.o.  at 39w0d presents for routine obstetric follow-up.  Size equals dates and/or scan    1.  Continue prenatal vitamins.  2.  Fetal kick counts.  3.  Exercise at least 30 minutes daily.  4.  Increase water intake.  5.  Labor precautions educated.  6.  Follow-up in 1 weeks.

## 2018-03-17 ENCOUNTER — HOSPITAL ENCOUNTER (INPATIENT)
Facility: MEDICAL CENTER | Age: 28
LOS: 2 days | End: 2018-03-19
Attending: OBSTETRICS & GYNECOLOGY | Admitting: OBSTETRICS & GYNECOLOGY
Payer: COMMERCIAL

## 2018-03-17 LAB
BASOPHILS # BLD AUTO: 0.6 % (ref 0–1.8)
BASOPHILS # BLD: 0.11 K/UL (ref 0–0.12)
EOSINOPHIL # BLD AUTO: 0.12 K/UL (ref 0–0.51)
EOSINOPHIL NFR BLD: 0.6 % (ref 0–6.9)
ERYTHROCYTE [DISTWIDTH] IN BLOOD BY AUTOMATED COUNT: 42.1 FL (ref 35.9–50)
HCT VFR BLD AUTO: 38 % (ref 37–47)
HGB BLD-MCNC: 12.8 G/DL (ref 12–16)
HOLDING TUBE BB 8507: NORMAL
IMM GRANULOCYTES # BLD AUTO: 0.12 K/UL (ref 0–0.11)
IMM GRANULOCYTES NFR BLD AUTO: 0.6 % (ref 0–0.9)
LYMPHOCYTES # BLD AUTO: 3.82 K/UL (ref 1–4.8)
LYMPHOCYTES NFR BLD: 19.4 % (ref 22–41)
MCH RBC QN AUTO: 30.5 PG (ref 27–33)
MCHC RBC AUTO-ENTMCNC: 33.7 G/DL (ref 33.6–35)
MCV RBC AUTO: 90.7 FL (ref 81.4–97.8)
MONOCYTES # BLD AUTO: 1.4 K/UL (ref 0–0.85)
MONOCYTES NFR BLD AUTO: 7.1 % (ref 0–13.4)
NEUTROPHILS # BLD AUTO: 14.17 K/UL (ref 2–7.15)
NEUTROPHILS NFR BLD: 71.7 % (ref 44–72)
NRBC # BLD AUTO: 0 K/UL
NRBC BLD-RTO: 0 /100 WBC
PLATELET # BLD AUTO: 291 K/UL (ref 164–446)
PMV BLD AUTO: 11 FL (ref 9–12.9)
RBC # BLD AUTO: 4.19 M/UL (ref 4.2–5.4)
WBC # BLD AUTO: 19.7 K/UL (ref 4.8–10.8)

## 2018-03-17 PROCEDURE — 59409 OBSTETRICAL CARE: CPT

## 2018-03-17 PROCEDURE — 304965 HCHG RECOVERY SERVICES

## 2018-03-17 PROCEDURE — 770002 HCHG ROOM/CARE - OB PRIVATE (112)

## 2018-03-17 PROCEDURE — 700111 HCHG RX REV CODE 636 W/ 250 OVERRIDE (IP): Performed by: OBSTETRICS & GYNECOLOGY

## 2018-03-17 PROCEDURE — 85025 COMPLETE CBC W/AUTO DIFF WBC: CPT

## 2018-03-17 PROCEDURE — 36415 COLL VENOUS BLD VENIPUNCTURE: CPT

## 2018-03-17 PROCEDURE — 700111 HCHG RX REV CODE 636 W/ 250 OVERRIDE (IP)

## 2018-03-17 PROCEDURE — 700102 HCHG RX REV CODE 250 W/ 637 OVERRIDE(OP): Performed by: OBSTETRICS & GYNECOLOGY

## 2018-03-17 PROCEDURE — A9270 NON-COVERED ITEM OR SERVICE: HCPCS | Performed by: OBSTETRICS & GYNECOLOGY

## 2018-03-17 PROCEDURE — 700105 HCHG RX REV CODE 258: Performed by: OBSTETRICS & GYNECOLOGY

## 2018-03-17 PROCEDURE — 303615 HCHG EPIDURAL/SPINAL ANESTHESIA FOR LABOR

## 2018-03-17 PROCEDURE — 700101 HCHG RX REV CODE 250

## 2018-03-17 RX ORDER — OXYCODONE HYDROCHLORIDE AND ACETAMINOPHEN 5; 325 MG/1; MG/1
1 TABLET ORAL EVERY 4 HOURS PRN
Status: DISCONTINUED | OUTPATIENT
Start: 2018-03-17 | End: 2018-03-19 | Stop reason: HOSPADM

## 2018-03-17 RX ORDER — ACETAMINOPHEN 325 MG/1
650 TABLET ORAL EVERY 4 HOURS PRN
Status: DISCONTINUED | OUTPATIENT
Start: 2018-03-17 | End: 2018-03-19 | Stop reason: HOSPADM

## 2018-03-17 RX ORDER — LIDOCAINE HYDROCHLORIDE 10 MG/ML
INJECTION, SOLUTION EPIDURAL; INFILTRATION; INTRACAUDAL; PERINEURAL
Status: COMPLETED
Start: 2018-03-17 | End: 2018-03-17

## 2018-03-17 RX ORDER — ALUMINA, MAGNESIA, AND SIMETHICONE 2400; 2400; 240 MG/30ML; MG/30ML; MG/30ML
30 SUSPENSION ORAL EVERY 6 HOURS PRN
Status: DISCONTINUED | OUTPATIENT
Start: 2018-03-17 | End: 2018-03-18 | Stop reason: HOSPADM

## 2018-03-17 RX ORDER — MISOPROSTOL 200 UG/1
800 TABLET ORAL
Status: DISCONTINUED | OUTPATIENT
Start: 2018-03-17 | End: 2018-03-18 | Stop reason: HOSPADM

## 2018-03-17 RX ORDER — ONDANSETRON 4 MG/1
4 TABLET, ORALLY DISINTEGRATING ORAL EVERY 6 HOURS PRN
Status: DISCONTINUED | OUTPATIENT
Start: 2018-03-17 | End: 2018-03-19 | Stop reason: HOSPADM

## 2018-03-17 RX ORDER — ROPIVACAINE HYDROCHLORIDE 2 MG/ML
INJECTION, SOLUTION EPIDURAL; INFILTRATION; PERINEURAL
Status: COMPLETED
Start: 2018-03-17 | End: 2018-03-17

## 2018-03-17 RX ORDER — IBUPROFEN 800 MG/1
800 TABLET ORAL EVERY 8 HOURS PRN
Status: DISCONTINUED | OUTPATIENT
Start: 2018-03-17 | End: 2018-03-19 | Stop reason: HOSPADM

## 2018-03-17 RX ORDER — ONDANSETRON 2 MG/ML
4 INJECTION INTRAMUSCULAR; INTRAVENOUS EVERY 6 HOURS PRN
Status: DISCONTINUED | OUTPATIENT
Start: 2018-03-17 | End: 2018-03-19 | Stop reason: HOSPADM

## 2018-03-17 RX ORDER — VALACYCLOVIR HYDROCHLORIDE 500 MG/1
500 TABLET, FILM COATED ORAL 2 TIMES DAILY
COMMUNITY
End: 2019-08-14 | Stop reason: SDUPTHER

## 2018-03-17 RX ORDER — ONDANSETRON 2 MG/ML
INJECTION INTRAMUSCULAR; INTRAVENOUS
Status: COMPLETED
Start: 2018-03-17 | End: 2018-03-17

## 2018-03-17 RX ORDER — SODIUM CHLORIDE, SODIUM LACTATE, POTASSIUM CHLORIDE, CALCIUM CHLORIDE 600; 310; 30; 20 MG/100ML; MG/100ML; MG/100ML; MG/100ML
INJECTION, SOLUTION INTRAVENOUS CONTINUOUS
Status: DISPENSED | OUTPATIENT
Start: 2018-03-17 | End: 2018-03-17

## 2018-03-17 RX ORDER — FLUTICASONE PROPIONATE 50 MCG
1 SPRAY, SUSPENSION (ML) NASAL DAILY
COMMUNITY
End: 2019-08-14 | Stop reason: SDUPTHER

## 2018-03-17 RX ORDER — ROPIVACAINE HYDROCHLORIDE 2 MG/ML
INJECTION, SOLUTION EPIDURAL; INFILTRATION; PERINEURAL
Status: ACTIVE
Start: 2018-03-17 | End: 2018-03-18

## 2018-03-17 RX ADMIN — SODIUM CHLORIDE, POTASSIUM CHLORIDE, SODIUM LACTATE AND CALCIUM CHLORIDE: 600; 310; 30; 20 INJECTION, SOLUTION INTRAVENOUS at 14:47

## 2018-03-17 RX ADMIN — IBUPROFEN 800 MG: 800 TABLET, FILM COATED ORAL at 23:05

## 2018-03-17 RX ADMIN — SODIUM CHLORIDE, POTASSIUM CHLORIDE, SODIUM LACTATE AND CALCIUM CHLORIDE: 600; 310; 30; 20 INJECTION, SOLUTION INTRAVENOUS at 20:37

## 2018-03-17 RX ADMIN — Medication 1 MILLI-UNITS/MIN: at 16:45

## 2018-03-17 RX ADMIN — Medication 2000 ML/HR: at 21:05

## 2018-03-17 RX ADMIN — Medication 125 ML/HR: at 21:29

## 2018-03-17 RX ADMIN — ONDANSETRON HYDROCHLORIDE 4 MG: 2 INJECTION, SOLUTION INTRAMUSCULAR; INTRAVENOUS at 18:13

## 2018-03-17 RX ADMIN — ROPIVACAINE HYDROCHLORIDE 100 ML: 2 INJECTION, SOLUTION EPIDURAL; INFILTRATION; PERINEURAL at 14:38

## 2018-03-17 RX ADMIN — SODIUM CHLORIDE, POTASSIUM CHLORIDE, SODIUM LACTATE AND CALCIUM CHLORIDE: 600; 310; 30; 20 INJECTION, SOLUTION INTRAVENOUS at 18:12

## 2018-03-17 RX ADMIN — LIDOCAINE HYDROCHLORIDE 30 ML: 10 INJECTION, SOLUTION EPIDURAL; INFILTRATION; INTRACAUDAL; PERINEURAL at 21:23

## 2018-03-17 RX ADMIN — OXYCODONE HYDROCHLORIDE AND ACETAMINOPHEN 1 TABLET: 5; 325 TABLET ORAL at 23:05

## 2018-03-17 ASSESSMENT — PATIENT HEALTH QUESTIONNAIRE - PHQ9
SUM OF ALL RESPONSES TO PHQ9 QUESTIONS 1 AND 2: 0
SUM OF ALL RESPONSES TO PHQ QUESTIONS 1-9: 0
1. LITTLE INTEREST OR PLEASURE IN DOING THINGS: NOT AT ALL
2. FEELING DOWN, DEPRESSED, IRRITABLE, OR HOPELESS: NOT AT ALL

## 2018-03-17 ASSESSMENT — LIFESTYLE VARIABLES
EVER_SMOKED: NEVER
DO YOU DRINK ALCOHOL: NO
ALCOHOL_USE: NO

## 2018-03-17 ASSESSMENT — PAIN SCALES - GENERAL
PAINLEVEL_OUTOF10: 5
PAINLEVEL_OUTOF10: 8

## 2018-03-17 NOTE — H&P
History and Physical    Bobbi Beltran is a 27 y.o. female  -Para:     Gestational Age:  39w2d  Admitted for:   Active Labor  Admitted to  [facility].  Patient received prenatal care: [facility]    HPI: Patient is admitted with the above mentioned Chief Complaint and States   Loss of fluid:   negative  Abdominal Pain:  positive  Uterine Contractions:  positive  Vaginal Bleeding:  negative  Fetal Movement:  normal  Patient denies fever, chills, nausea, vomiting , headache, visual disturbance, or dysuria  Patient's last menstrual period was 06/15/2017.  Estimated Date of Delivery: 3/22/18  Final GUERA: 3/22/2018, by Last Menstrual Period    Patient Active Problem List    Diagnosis Date Noted   • Impaired glucose tolerance (oral) 2018   • Encounter for supervision of normal first pregnancy in third trimester 2017   • Food allergy 2016   • Genital herpes simplex 2016       Admitting DX: Pregnancy  Pregnancy Complications:  maternal infection--HSV on Sup[pression   OB Risk Factors:   none  Labor State:    Active phase labor.    History:   has a past medical history of Herpes.     has no past surgical history on file.    OB History    Para Term  AB Living   1             SAB TAB Ectopic Molar Multiple Live Births                    # Outcome Date GA Lbr Nathen/2nd Weight Sex Delivery Anes PTL Lv   1 Current                   Medications:  No current facility-administered medications on file prior to encounter.      Current Outpatient Prescriptions on File Prior to Encounter   Medication Sig Dispense Refill   • Prenatal MV-Min-Fe Fum-FA-DHA (PRENATAL 1 PO) Take  by mouth.     • montelukast (SINGULAIR) 10 MG Tab Take 10 mg by mouth every day.     • montelukast (SINGULAIR) 10 MG Tab Take 1 Tab by mouth every day. 30 Tab 12       Allergies:  Ciprofloxacin; Morphine; Sulfa drugs; Sulfamethoxazole w-trimethoprim; Sulfate; Sulfites; Ventolin  "[albuterol]; and Amoxicillin    ROS:   Neuro: negative for headache    Cardiovascular: negative for lower extremity edema  Gastro intestinal: negative  Genitourinary: negative            Physical Exam:  Temp 37.5 °C (99.5 °F) (Temporal)   Resp 18   Ht 1.803 m (5' 11\")   Wt 78.5 kg (173 lb)   LMP 06/15/2017   BMI 24.13 kg/m²   Constitutional: healthy-appearing, Well-developed, well-nourished, slender, white female, in moderate distress  No JVD: while supine, at 30 degrees  HEENT: PERRLA, EOMI and Sclera clear, anicteric  Breast Exam: Inspection negative. No nipple discharge or bleeding. No masses or nodularity palpable  Cardio: regular rate and rhythm, S1, S2 normal, no murmur, click, rub or gallop  Lung: unlabored respirations, no intercostal retractions or accessory muscle use, clear to auscultation without rales or wheezes  Abdomen: abdomen is soft without significant tenderness, masses, organomegaly or guarding  Extremity: extremities, peripheral pulses and reflexes normal    Cervical Exam: 60%  Cervix Dilatation: 4  Station: negative 2  Pelvis: Normal  Fetal Assessment: Fetal heart variability: moderate  Uterine contractions: irregular, every 3-6 minutes  Estimated Fetal Weight: 3000 - 3500g      Labs:      Prenatal Results     1st Trimester     Test Value Date Time    ABO A  10/28/17 1233    RH POS  10/28/17 1233    Antibody NEG  10/28/17 1233    CBC/PLT/DIFF       HGB 11.6 g/dL (L) 01/20/18 1147    Platelets 194 K/uL 10/28/17 1233    HGB A1C        1 Hr  mg/dL (H) 01/20/18 1147    3 Hr GTT 75 mg/dL 02/10/18 0744    Rubella 172.10 IU/mL 10/28/17 1233    RPR       Urine Culture Mixed skin chino >100,000 cfu/mL  10/28/17 1232    24 Urine Protein        24 Urine Creatinine        HBsAg Negative  10/28/17 1233    Hep CAB        HIV       Gonorrhea       Chlamydia       TSH        Free T4         TB       Pap       SYPHILUS TREP QUAL [id number] [[id number]][             2nd Trimester     Test Value Date Time    " HCT 33.4 % (L) 18 1147    HGB 11.6 g/dL (L) 18 1147    1 Hr  mg/dL (H) 18 1147    3 Hr GTT 75 mg/dL 02/10/18 0744          24-28 Weeks     Test Value Date Time    1 Hr GCT  152 mg/dL (H) 18 1147    TSH        Free T4        24 Urine Protein       24 Urine Creatinine       BUN       Creatinine       GFR       AST       ALT       Uric Acid       LDH             3rd Trimester     Test Value Date Time    HCT 33.4 % (L) 18 1147    HGB 11.6 g/dL (L) 18 1147    TSH       Free T4       24 Hr Urine Protein       24 Hr Urine Creatinine       SYPHILUS TREP QUAL Non Reactive  18 1147          35-37 Weeks     Test Value Date Time    GBS PCR LB Negative  18 1430    GBS PCR Negative  18 1430          Genetic Screening     Test Value Date Time    Cystic Fibrosis       AFP Quad Normal  10/28/17 1233    Sickle Cell                     Assessment:  Gestational Age:  39w2d  Labor State:   Labor, Active  Risk Factors:   HSV   Pregnancy Complications: none    Patient Active Problem List    Diagnosis Date Noted   • Impaired glucose tolerance (oral) 2018   • Encounter for supervision of normal first pregnancy in third trimester 2017   • Food allergy 2016   • Genital herpes simplex 2016       Plan:   Admitted for: Active Labor    CBC  routine urinalysis  Antibiotics: For Infectionnone    Chaz Nam M.D.

## 2018-03-17 NOTE — PROGRESS NOTES
"27 y.o.  EDC 3/22/18 EGA 39.2 A+, RI, Hb neg, GBS neg here to LDA5 with FOB \"Day[place]\" c/o regular contractions q 5 min since 1000. EFM & [place] applied. Pt states she is allergic to Sulfa drugs, Cipro, Amox, Morphine, Albuterol. Pt reports positive fetal movement, denies vaginal bleeding or LOF. Pt has hx of HSV last outbreak 2017 currently on suppression therapy.   EFM & TOCO applied.    1240 SVE /-2/post desires epidural.   "

## 2018-03-18 LAB
ERYTHROCYTE [DISTWIDTH] IN BLOOD BY AUTOMATED COUNT: 42.8 FL (ref 35.9–50)
HCT VFR BLD AUTO: 31.6 % (ref 37–47)
HGB BLD-MCNC: 10.3 G/DL (ref 12–16)
MCH RBC QN AUTO: 29.8 PG (ref 27–33)
MCHC RBC AUTO-ENTMCNC: 32.6 G/DL (ref 33.6–35)
MCV RBC AUTO: 91.3 FL (ref 81.4–97.8)
PLATELET # BLD AUTO: 244 K/UL (ref 164–446)
PMV BLD AUTO: 11.2 FL (ref 9–12.9)
RBC # BLD AUTO: 3.46 M/UL (ref 4.2–5.4)
WBC # BLD AUTO: 20.8 K/UL (ref 4.8–10.8)

## 2018-03-18 PROCEDURE — A9270 NON-COVERED ITEM OR SERVICE: HCPCS | Performed by: PHYSICIAN ASSISTANT

## 2018-03-18 PROCEDURE — 700102 HCHG RX REV CODE 250 W/ 637 OVERRIDE(OP): Performed by: OBSTETRICS & GYNECOLOGY

## 2018-03-18 PROCEDURE — 85027 COMPLETE CBC AUTOMATED: CPT

## 2018-03-18 PROCEDURE — 36415 COLL VENOUS BLD VENIPUNCTURE: CPT

## 2018-03-18 PROCEDURE — 700102 HCHG RX REV CODE 250 W/ 637 OVERRIDE(OP): Performed by: PHYSICIAN ASSISTANT

## 2018-03-18 PROCEDURE — A9270 NON-COVERED ITEM OR SERVICE: HCPCS | Performed by: OBSTETRICS & GYNECOLOGY

## 2018-03-18 PROCEDURE — 770002 HCHG ROOM/CARE - OB PRIVATE (112)

## 2018-03-18 PROCEDURE — 700112 HCHG RX REV CODE 229: Performed by: OBSTETRICS & GYNECOLOGY

## 2018-03-18 RX ORDER — SODIUM CHLORIDE, SODIUM LACTATE, POTASSIUM CHLORIDE, CALCIUM CHLORIDE 600; 310; 30; 20 MG/100ML; MG/100ML; MG/100ML; MG/100ML
INJECTION, SOLUTION INTRAVENOUS PRN
Status: DISCONTINUED | OUTPATIENT
Start: 2018-03-18 | End: 2018-03-19 | Stop reason: HOSPADM

## 2018-03-18 RX ORDER — OXYCODONE AND ACETAMINOPHEN 10; 325 MG/1; MG/1
1 TABLET ORAL EVERY 4 HOURS PRN
Status: DISCONTINUED | OUTPATIENT
Start: 2018-03-18 | End: 2018-03-19 | Stop reason: HOSPADM

## 2018-03-18 RX ORDER — DOCUSATE SODIUM 100 MG/1
100 CAPSULE, LIQUID FILLED ORAL 2 TIMES DAILY PRN
Status: DISCONTINUED | OUTPATIENT
Start: 2018-03-18 | End: 2018-03-19 | Stop reason: HOSPADM

## 2018-03-18 RX ORDER — MISOPROSTOL 200 UG/1
600 TABLET ORAL
Status: DISCONTINUED | OUTPATIENT
Start: 2018-03-18 | End: 2018-03-19 | Stop reason: HOSPADM

## 2018-03-18 RX ORDER — FERROUS SULFATE 325(65) MG
325 TABLET ORAL
Status: DISCONTINUED | OUTPATIENT
Start: 2018-03-18 | End: 2018-03-19 | Stop reason: HOSPADM

## 2018-03-18 RX ADMIN — IBUPROFEN 800 MG: 800 TABLET, FILM COATED ORAL at 07:11

## 2018-03-18 RX ADMIN — Medication 325 MG: at 07:11

## 2018-03-18 RX ADMIN — OXYCODONE HYDROCHLORIDE AND ACETAMINOPHEN 1 TABLET: 10; 325 TABLET ORAL at 11:32

## 2018-03-18 RX ADMIN — Medication 325 MG: at 17:28

## 2018-03-18 RX ADMIN — DOCUSATE SODIUM 100 MG: 100 CAPSULE ORAL at 03:06

## 2018-03-18 RX ADMIN — OXYCODONE HYDROCHLORIDE AND ACETAMINOPHEN 1 TABLET: 5; 325 TABLET ORAL at 16:12

## 2018-03-18 RX ADMIN — OXYCODONE HYDROCHLORIDE AND ACETAMINOPHEN 1 TABLET: 5; 325 TABLET ORAL at 03:06

## 2018-03-18 RX ADMIN — OXYCODONE HYDROCHLORIDE AND ACETAMINOPHEN 1 TABLET: 10; 325 TABLET ORAL at 07:11

## 2018-03-18 RX ADMIN — OXYCODONE HYDROCHLORIDE AND ACETAMINOPHEN 1 TABLET: 10; 325 TABLET ORAL at 20:54

## 2018-03-18 RX ADMIN — Medication 325 MG: at 11:30

## 2018-03-18 RX ADMIN — IBUPROFEN 800 MG: 800 TABLET, FILM COATED ORAL at 16:12

## 2018-03-18 RX ADMIN — DOCUSATE SODIUM 100 MG: 100 CAPSULE ORAL at 17:28

## 2018-03-18 ASSESSMENT — PAIN SCALES - GENERAL
PAINLEVEL_OUTOF10: 7
PAINLEVEL_OUTOF10: 5
PAINLEVEL_OUTOF10: 4
PAINLEVEL_OUTOF10: 6
PAINLEVEL_OUTOF10: 5
PAINLEVEL_OUTOF10: 4
PAINLEVEL_OUTOF10: 6

## 2018-03-18 NOTE — PROGRESS NOTES
1715- report received from FABBY Garcia RN, accepted care of pt. Epidural in place, denies pain at this time. SO Dayron and family at bedside.   1800- decel noted, RN to bedside. O2 on, IVF bolus, turned R and L sides. SVE complete/+1. Dr. Nam notified.   1830- [facility] noted, updated  RN.   1857- Dr. Nam at bedside, amnioinfusion ordered. Pt's pushing evaluated.   1915- Dr. Nam at bedside, tracing reviewed. Discussion with pt and SO regarding progress.   1956- Dr. Nam at bedside.   2041- Dr. Nam at bedside. Orders for forcep assist delivery. Charge JAMIE Reinoso notified. Tech called,  RN called, RT called.  2050- delivery of female infant, APGARs 8/8. Placed skin to skin with MOB.   2306- pt medicated for pain per MAR.   2330- pt up to bathroom with x1 assist, steady gait. Void large amount.   0030- pt transferred to  in stable condition. Report to Deepika AYALA.

## 2018-03-18 NOTE — PROGRESS NOTES
"0700 - Bedside report received from JAMIE Poe. Patient care assumed. Chart and orders reviewed  0815 - Pt assessment complete, wnl. Fundus firm with minimal discharge. Pt ambulating to bathroom and voiding without difficulty; perineum with slight edema and per patient \"very sore\". Encouraged the use of tucks, spray, and ice packs.  Patient denies any dizziness or lightheadedness at this time. Patient denies any calf pain or tenderness at this time. Reviewed use of emergency light. Plan of care discussed with patient for the day. Pain medication plan discussed with patient; patient states she will call if PRN pain medication is wanted. All questions/concerns addressed at this time. Encouraged to call with needs, will monitor    "

## 2018-03-18 NOTE — PROGRESS NOTES
1345- report received from Dmitry RN  1425- Dr Cabeazs in room to place epidural. Pt tolerated procedure well, no complications with test dose.   1600- Dr Nam in room, AROM clear fluid.  1715- Report to Scar Momin

## 2018-03-18 NOTE — PROGRESS NOTES
Post Partum Progress Note    Name:   Bobbi Beltran   Date/Time:  3/18/2018 - 6:02 AM  Chief Admitting Dx:  Pregnancy  Indication for care or intervention in labor or delivery  Delivery Type:  forceps  Post-Op/Post Partum Days #:  1    Subjective:  Abdominal pain: yes  Ambulating:   yes  Tolerating liquids:  yes  Tolerating food:  yes common adult  Flatus:   yes  BM:    no  Bleeding:   without any bleeding  Voiding:   yes  Dizziness:   yes  Feeding:   breast    Vitals:    03/17/18 2300 03/17/18 2306 03/18/18 0100 03/18/18 0400   BP: 128/61 128/61 117/69 125/62   Pulse: (!) 103 (!) 103 86 82   Resp:   19 19   Temp:   36.8 °C (98.2 °F) 36.7 °C (98 °F)   TempSrc:       SpO2:   95% 95%   Weight:       Height:           Exam:  Breast: Tenderness no, Engorged no and Lactating yes  Abdomen: Abdomen soft, non-tender. BS normal. No masses,  No organomegaly  Fundal Tenderness:  yes  Fundus Firm: yes  Incision: none  Below umbilicus: yes  Perineum: midline episiotomy  Lochia: mild  Extremities: Normal extremities, peripheral pulses and reflexes normal    Meds:  Current Facility-Administered Medications   Medication Dose   • LR infusion     • PRN oxytocin (PITOCIN) (20 Units/1000 mL) PRN for excessive uterine bleeding - See Admin Instr  125-999 mL/hr   • miSOPROStol (CYTOTEC) tablet 600 mcg  600 mcg   • docusate sodium (COLACE) capsule 100 mg  100 mg   • ondansetron (ZOFRAN ODT) dispertab 4 mg  4 mg    Or   • ondansetron (ZOFRAN) syringe/vial injection 4 mg  4 mg   • oxytocin (PITOCIN) infusion (for postpartum)   mL/hr   • ibuprofen (MOTRIN) tablet 800 mg  800 mg   • acetaminophen (TYLENOL) tablet 650 mg  650 mg   • oxyCODONE-acetaminophen (PERCOCET) 5-325 MG per tablet 1 Tab  1 Tab   • oxytocin (PITOCIN) 20 UNITS/1000ML LR (induction of labor)  0.5-20 isaias-units/min       Labs:   Recent Labs      03/17/18   1330  03/18/18   0530   WBC  19.7*  20.8*   RBC  4.19*  3.46*   HEMOGLOBIN  12.8  10.3*   HEMATOCRIT  38.0   31.6*   MCV  90.7  91.3   MCH  30.5  29.8   MCHC  33.7  32.6*   RDW  42.1  42.8   PLATELETCT  291  244   MPV  11.0  11.2       Assessment:  Chief Admitting Dx:  Pregnancy  Indication for care or intervention in labor or delivery  Delivery Type:  forceps  Tubal Ligation:  no    Plan:  Continue routine post partum care.  Late  Delivery , Baby on Benz .   Hold D/C   Add FeSo4       Chaz Nam M.D.

## 2018-03-18 NOTE — CONSULTS
Lactation visit done. Mother states baby  after the FSBS was done.  Baby asleep in mothers arms. Enc to call for latch check with next feeding. Enc to undress baby and keep her skin to skin, feed on cue . Discussed feeding frequency norms for age and 2nd 48 hours cluster feeding.

## 2018-03-18 NOTE — PROGRESS NOTES
Report received from JAMIE Poe. Plan of care reviewed, patient care assumed. Patient requesting bed extender which was delivered to room, discharge education reviewed with patient. Needs addressed at this time. Infant on monitor in NBN. Rounding in place.

## 2018-03-18 NOTE — CARE PLAN
Problem: Altered physiologic condition related to immediate post-delivery state and potential for bleeding/hemorrhage  Goal: Patient physiologically stable as evidenced by normal lochia, palpable uterine involution and vital signs within normal limits  Outcome: PROGRESSING AS EXPECTED  Lochia light, fundus firm, VSS    Problem: Potential knowledge deficit related to lack of understanding of self and  care  Goal: Patient will demonstrate ability to care for self and infant  Outcome: PROGRESSING AS EXPECTED  Discharge instructions reviewed with patient and all admission policies and procedures.

## 2018-03-19 VITALS
SYSTOLIC BLOOD PRESSURE: 95 MMHG | DIASTOLIC BLOOD PRESSURE: 58 MMHG | BODY MASS INDEX: 24.22 KG/M2 | HEART RATE: 66 BPM | RESPIRATION RATE: 20 BRPM | OXYGEN SATURATION: 96 % | TEMPERATURE: 98.6 F | HEIGHT: 71 IN | WEIGHT: 173 LBS

## 2018-03-19 PROCEDURE — 700102 HCHG RX REV CODE 250 W/ 637 OVERRIDE(OP): Performed by: OBSTETRICS & GYNECOLOGY

## 2018-03-19 PROCEDURE — 700102 HCHG RX REV CODE 250 W/ 637 OVERRIDE(OP): Performed by: PHYSICIAN ASSISTANT

## 2018-03-19 PROCEDURE — A9270 NON-COVERED ITEM OR SERVICE: HCPCS | Performed by: PHYSICIAN ASSISTANT

## 2018-03-19 PROCEDURE — A9270 NON-COVERED ITEM OR SERVICE: HCPCS | Performed by: OBSTETRICS & GYNECOLOGY

## 2018-03-19 RX ORDER — LANOLIN ALCOHOL/MO/W.PET/CERES
325 CREAM (GRAM) TOPICAL DAILY
Qty: 30 TAB | Refills: 2 | Status: SHIPPED | OUTPATIENT
Start: 2018-03-19 | End: 2019-03-14

## 2018-03-19 RX ORDER — IBUPROFEN 800 MG/1
800 TABLET ORAL EVERY 8 HOURS PRN
Qty: 30 TAB | Refills: 1 | Status: SHIPPED | OUTPATIENT
Start: 2018-03-19 | End: 2019-03-14

## 2018-03-19 RX ORDER — PSEUDOEPHEDRINE HCL 30 MG
100 TABLET ORAL 2 TIMES DAILY PRN
Qty: 60 CAP | Refills: 0 | Status: SHIPPED | OUTPATIENT
Start: 2018-03-19 | End: 2019-03-14

## 2018-03-19 RX ADMIN — OXYCODONE HYDROCHLORIDE AND ACETAMINOPHEN 1 TABLET: 10; 325 TABLET ORAL at 03:39

## 2018-03-19 RX ADMIN — IBUPROFEN 800 MG: 800 TABLET, FILM COATED ORAL at 03:39

## 2018-03-19 RX ADMIN — Medication 325 MG: at 07:43

## 2018-03-19 RX ADMIN — OXYCODONE HYDROCHLORIDE AND ACETAMINOPHEN 1 TABLET: 5; 325 TABLET ORAL at 07:48

## 2018-03-19 ASSESSMENT — PAIN SCALES - GENERAL
PAINLEVEL_OUTOF10: 5
PAINLEVEL_OUTOF10: 7

## 2018-03-19 ASSESSMENT — LIFESTYLE VARIABLES: EVER_SMOKED: NEVER

## 2018-03-19 NOTE — PROGRESS NOTES
Follow-up lactation visit. Night shift RN stated that YAHIR was concerned with infant weight loss. She started supplementing infant last night.     Discussed discharge feeding plan-   1- To breastfeed first.  2- if latch or breastfeeding is suboptimal, can supplement according to [employer] 10-20-30 guidelines. (Volumes reviewed per infant birthweight)  3. Use breastpump to protect supply.     YAHIR states she has her pump on the way from her insurance.     Observed MOB latching infant, assisted with latch, and encouraged good alignment- tummy to tummy, and nose-to nipple for a nice deeper latch.    New Beginnings booklet given, and breastfeeding section reviewed. Reviewed outpatient lactation services available to her at [facility], and invited her to breastfeeding forums.    YAHIR has no other questions or concerns regarding breastfeeding. Encouraged to call for support as needed.

## 2018-03-19 NOTE — DISCHARGE INSTRUCTIONS
POSTPARTUM DISCHARGE INSTRUCTIONS FOR MOM    YOB: 1990   Age: 27 y.o.               Admit Date: 3/17/2018     Discharge Date: 3/19/2018  Attending Doctor:  Chaz Nam M.D.                  Allergies:  Ciprofloxacin; Morphine; Sulfa drugs; Sulfamethoxazole w-trimethoprim; Sulfate; Sulfites; Ventolin [albuterol]; and Amoxicillin    Discharged to home by car. Discharged via wheelchair, hospital escort: Yes.  Special equipment needed: Not Applicable  Belongings with: Personal  Be sure to schedule a follow-up appointment with your primary care doctor or any specialists as instructed.     Discharge Plan:   Diet Plan: Discussed  Activity Level: Discussed  Confirmed Follow up Appointment: Patient to Call and Schedule Appointment  Confirmed Symptoms Management: Discussed  Medication Reconciliation Updated: Yes  Influenza Vaccine Indication: Patient Refuses    REASONS TO CALL YOUR OBSTETRICIAN:  1.   Persistent fever or shaking chills (Temperature higher than 100.4)  2.   Heavy bleeding (soaking more than 1 pad per hour); Passing clots  3.   Foul odor from vagina  4.   Mastitis (Breast infection; breast pain, chills, fever, redness)  5.   Urinary pain, burning or frequency  6.   Episiotomy infection  7.   Abdominal incision infection  8.   Severe depression longer than 24 hours    HAND WASHING  · Prior to handling the baby.  · Before breastfeeding or bottle feeding baby.  · After using the bathroom or changing the baby's diaper.    WOUND CARE  Ask your physician for additional care instructions.  In general:    ·  Incision:      · Keep clean and dry.    · Do NOT lift anything heavier than your baby for up to 6 weeks.    · There should not be any opening or pus.      VAGINAL CARE  · Nothing inside vagina for 6 weeks: no sexual intercourse, tampons or douching.  · Bleeding may continue for 2-4 weeks.  Amount may vary.    · Call your physician for heavy bleeding which means soaking more than 1 pad per  "hour    BIRTH CONTROL  · It is possible to become pregnant at any time after delivery and while breastfeeding.  · Plan to discuss a method of birth control with your physician at your follow up visit. visit.    DIET AND ELIMINATION  · Eating more fiber (bran cereal, fruits, and vegetables) and drinking plenty of fluids will help to avoid constipation.  · Urinary frequency after childbirth is normal.    POSTPARTUM BLUES  During the first few days after birth, you may experience a sense of the \"blues\" which may include impatience, irritability or even crying.  These feeling come and go quickly.  However, as many as 1 in 10 women experience emotional symptoms known as postpartum depression.    Postpartum depression:  May start as early as the second or third day after delivery or take several weeks or months to develop.  Symptoms of \"blues\" are present, but are more intense:  Crying spells; loss of appetite; feelings of hopelessness or loss of control; fear of touching the baby; over concern or no concern at all about the baby; little or no concern about your own appearance/caring for yourself; and/or inability to sleep or excessive sleeping.  Contact your physician if you are experiencing any of these symptoms.    Crisis Hotline:  · [place] Crisis Hotline:  5-149-UQDFDMN  Or 1-227.304.9810  · [place] Crisis Hotline:  1-623.401.3838  Or 937-310-3766    PREVENTING SHAKEN BABY:  If you are angry or stressed, PUT THE BABY IN THE CRIB, step away, take some deep breaths, and wait until you are calm to care for the baby.  DO NOT SHAKE THE BABY.  You are not alone, call a supporter for help.    · Crisis Call Center 24/7 crisis line 155-773-8942 or 1-684.334.6017  · You can also text them, text \"ANSWER\" to 369922    QUIT SMOKING/TOBACCO USE:  I understand the use of any tobacco products increases my chance of suffering from future heart disease and could cause other illnesses which may shorten my life. Quitting the use of " tobacco products is the single most important thing I can do to improve my health. For further information on smoking / tobacco cessation call a Toll Free Quit Line at 1-674.794.7413 (*[employer] [place]) or 1-456.528.8774 (American Lung Association) or you can access the web based program at www.lungusa.org.    · [place] Tobacco Users Help Line:  (449) 103-4054       Toll Free: 1-714.308.9886  · Quit Tobacco Program [facility] (502)951-2594    DEPRESSION / SUICIDE RISK:  As you are discharged from this [facility], it is important to learn how to keep safe from harming yourself.    Recognize the warning signs:  · Abrupt changes in personality, positive or negative- including increase in energy   · Giving away possessions  · Change in eating patterns- significant weight changes-  positive or negative  · Change in sleeping patterns- unable to sleep or sleeping all the time   · Unwillingness or inability to communicate  · Depression  · Unusual sadness, discouragement and loneliness  · Talk of wanting to die  · Neglect of personal appearance   · Rebelliousness- reckless behavior  · Withdrawal from people/activities they love  · Confusion- inability to concentrate     If you or a loved one observes any of these behaviors or has concerns about self-harm, here's what you can do:  · Talk about it- your feelings and reasons for harming yourself  · Remove any means that you might use to hurt yourself (examples: pills, rope, extension cords, firearm)  · Get professional help from the community (Mental Health, Substance Abuse, psychological counseling)  · Do not be alone:Call your Safe Contact- someone whom you trust who will be there for you.  · Call your local CRISIS HOTLINE 519-2293 or 884-942-5554  · Call your local Children's Mobile Crisis Response Team [place] (193) 810-4336 or www.PasswordBank  · Call the toll free National Suicide Prevention Hotlines   · National  Suicide Prevention Lifeline 567-964-ZXQU (3016)  · [facility] 800-SUICIDE (206-3755)    DISCHARGE SURVEY:  Thank you for choosing [facility].  We hope we provided you with very good care.  You may be receiving a survey in the mail.  Please fill it out.  Your opinion is valuable to us.    ADDITIONAL EDUCATIONAL MATERIALS GIVEN TO PATIENT:        My signature on this form indicates that:  1.  I have reviewed and understand the above information  2.  My questions regarding this information have been answered to my satisfaction.  3.  I have formulated a plan with my discharge nurse to obtain my prescribed medication for home.

## 2018-03-19 NOTE — PROGRESS NOTES
Pt discharged home with all personal belongings and infant securely strapped in car seat.  Discharge RN provided d/c instructions and pt verbalized understanding.  Cuddles and armbands verified and removed prior to transport to car.

## 2018-03-19 NOTE — DISCHARGE SUMMARY
Discharge Summary:      Bobbi Beltran      Admit Date:   3/17/2018  Discharge Date:  3/19/2018     Admitting diagnosis:  Indication for care or intervention in labor or delivery [O75.9]  Discharge Diagnosis: Status post vaginal, spontaneous.  Pregnancy Complications: none  Tubal Ligation:  no        History:  Past Medical History:   Diagnosis Date   • Herpes      OB History    Para Term  AB Living   1             SAB TAB Ectopic Molar Multiple Live Births                    # Outcome Date GA Lbr Nathen/2nd Weight Sex Delivery Anes PTL Lv   1 Current                    Ciprofloxacin; Morphine; Sulfa drugs; Sulfamethoxazole w-trimethoprim; Sulfate; Sulfites; Ventolin [albuterol]; and Amoxicillin  Patient Active Problem List    Diagnosis Date Noted   • Impaired glucose tolerance (oral) 2018   • Encounter for supervision of normal first pregnancy in third trimester 2017   • Food allergy 2016   • Genital herpes simplex 2016        Hospital Course:   27 y.o. , now para 1, was admitted with the above mentioned diagnosis, underwent Active Labor, vaginal, spontaneous. Patient postpartum course was unremarkable, with progressive advancement in diet , ambulation and toleration of oral analgesia. Patient without complaints today and desires discharge.      Vitals:    18 0100 18 0400 18 0800 18   BP: 117/69 125/62 107/61 105/61   Pulse: 86 82 69 75   Resp:  16   Temp: 36.8 °C (98.2 °F) 36.7 °C (98 °F) 36.6 °C (97.9 °F) 36.8 °C (98.3 °F)   TempSrc:       SpO2: 95% 95% 96% 95%   Weight:       Height:           Current Facility-Administered Medications   Medication Dose   • LR infusion     • PRN oxytocin (PITOCIN) (20 Units/1000 mL) PRN for excessive uterine bleeding - See Admin Instr  125-999 mL/hr   • miSOPROStol (CYTOTEC) tablet 600 mcg  600 mcg   • docusate sodium (COLACE) capsule 100 mg  100 mg   • ferrous sulfate tablet 325 mg  325 mg   •  oxyCODONE-acetaminophen (PERCOCET-10)  MG per tablet 1 Tab  1 Tab   • ondansetron (ZOFRAN ODT) dispertab 4 mg  4 mg    Or   • ondansetron (ZOFRAN) syringe/vial injection 4 mg  4 mg   • oxytocin (PITOCIN) infusion (for postpartum)   mL/hr   • ibuprofen (MOTRIN) tablet 800 mg  800 mg   • acetaminophen (TYLENOL) tablet 650 mg  650 mg   • oxyCODONE-acetaminophen (PERCOCET) 5-325 MG per tablet 1 Tab  1 Tab   • oxytocin (PITOCIN) 20 UNITS/1000ML LR (induction of labor)  0.5-20 isaias-units/min       Exam:  Breast Exam: negative  Abdomen: Abdomen soft, non-tender. BS normal. No masses,  No organomegaly  Fundus Non Tender: yes  Incision: none  Perineum: midline episiotomy  Extremity: extremities, peripheral pulses and reflexes normal     Labs:  Recent Labs      03/17/18   1330  03/18/18   0530   WBC  19.7*  20.8*   RBC  4.19*  3.46*   HEMOGLOBIN  12.8  10.3*   HEMATOCRIT  38.0  31.6*   MCV  90.7  91.3   MCH  30.5  29.8   MCHC  33.7  32.6*   RDW  42.1  42.8   PLATELETCT  291  244   MPV  11.0  11.2        Activity:   Discharge to home  Pelvic Rest x 6 weeks    Assessment:  normal postpartum course  Discharge Assessment: No areas of skin breakdown/redness; surgical incision intact/healing     Follow up: . [facility] in 5 weeks for vaginal      Discharge Meds:   Current Outpatient Prescriptions   Medication Sig Dispense Refill   • ibuprofen (MOTRIN) 800 MG Tab Take 1 Tab by mouth every 8 hours as needed (For cramping after delivery; do not give if patient is receiving ketorolac (Toradol)). 30 Tab 1   • docusate sodium 100 MG Cap Take 100 mg by mouth 2 times a day as needed for Constipation. 60 Cap 0   • ferrous sulfate 325 (65 Fe) MG EC tablet Take 1 Tab by mouth every day. 30 Tab 2       TORRES Fonseca

## 2018-03-19 NOTE — CARE PLAN
Problem: Pain Management  Goal: Pain level will decrease to patient's comfort goal  Outcome: PROGRESSING AS EXPECTED  Patient would like to keep Prn pain medication as scheduled but not during sleeping hours.     Problem: Communication  Goal: The ability to communicate needs accurately and effectively will improve  Outcome: PROGRESSING AS EXPECTED  Pt. Ambulating well in halls, taking adequate PO fluids and voiding. Pt. Requested similac, Educated in regards to formula feeding, all questions and concerns answered. Pt. Verbalized understanding.

## 2018-03-20 ENCOUNTER — TELEPHONE (OUTPATIENT)
Dept: OBGYN | Facility: CLINIC | Age: 28
End: 2018-03-20

## 2018-03-20 RX ORDER — OXYCODONE HYDROCHLORIDE AND ACETAMINOPHEN 5; 325 MG/1; MG/1
1-2 TABLET ORAL EVERY 4 HOURS PRN
Qty: 15 TAB | Refills: 0 | Status: SHIPPED | OUTPATIENT
Start: 2018-03-20 | End: 2018-03-23

## 2018-03-20 NOTE — TELEPHONE ENCOUNTER
Patient called and was told the if she needed pain meds to call and the Dr would prescribe them, had her baby 03/17/18. Also had some quitions about birth certificate Please call patient

## 2018-03-29 ENCOUNTER — TELEPHONE (OUTPATIENT)
Dept: OBGYN | Facility: MEDICAL CENTER | Age: 28
End: 2018-03-29

## 2018-03-29 NOTE — TELEPHONE ENCOUNTER
Called and informed the patient that form was received, faxed, and scanned into her chart. Pt thankful

## 2018-03-29 NOTE — TELEPHONE ENCOUNTER
----- Message from Lotus Tirado sent at 3/29/2018 10:04 AM PDT -----  Regarding: questions  Contact: [phone]  Patient called and wanted to know if we had received a detailed report from [facility] for her breast pump? Thank you.

## 2018-04-24 ENCOUNTER — POST PARTUM (OUTPATIENT)
Dept: OBGYN | Facility: CLINIC | Age: 28
End: 2018-04-24
Payer: COMMERCIAL

## 2018-04-24 VITALS — SYSTOLIC BLOOD PRESSURE: 112 MMHG | DIASTOLIC BLOOD PRESSURE: 68 MMHG

## 2018-04-24 PROCEDURE — 90050 PR POSTPARTUM VISIT: CPT | Performed by: OBSTETRICS & GYNECOLOGY

## 2018-04-24 RX ORDER — ACETAMINOPHEN AND CODEINE PHOSPHATE 120; 12 MG/5ML; MG/5ML
1 SOLUTION ORAL DAILY
Qty: 28 TAB | Refills: 6 | Status: SHIPPED | OUTPATIENT
Start: 2018-04-24 | End: 2018-07-05

## 2018-04-24 NOTE — PROGRESS NOTES
27 y.o.   female  S/P  forceps, outlet. Date of Delivery:3/2018 , Forcep  delivery                                      Presents for PostPartum Exam.    Subjective:No pain, bleeding, unusual discharge or leeking, No substance abuse, STD risk, ZARIA exposure and No transfusions no discharge with a small amount of bleeding  breastNot specifically reviewed, No new breast lumps or masses, No severe breast pain, No nipple discharge, No recent change in shape/color  with a small amount of bleeding    Past Medical History:   Diagnosis Date   • Herpes      Past Surgical History:   Procedure Laterality Date   • OTHER Right 2014    fibroid removal right breast   • OTHER  2008    wisdom teeth extraction     Current Outpatient Prescriptions on File Prior to Visit   Medication Sig Dispense Refill   • ibuprofen (MOTRIN) 800 MG Tab Take 1 Tab by mouth every 8 hours as needed (For cramping after delivery; do not give if patient is receiving ketorolac (Toradol)). 30 Tab 1   • docusate sodium 100 MG Cap Take 100 mg by mouth 2 times a day as needed for Constipation. 60 Cap 0   • ferrous sulfate 325 (65 Fe) MG EC tablet Take 1 Tab by mouth every day. 30 Tab 2   • fluticasone (FLONASE) 50 MCG/ACT nasal spray Spray 1 Spray in nose every day.     • valACYclovir (VALTREX) 500 MG Tab Take 500 mg by mouth 2 times a day.     • Doxylamine Succinate, Sleep, (UNISOM PO) Take  by mouth.     • Prenatal MV-Min-Fe Fum-FA-DHA (PRENATAL 1 PO) Take  by mouth.     • montelukast (SINGULAIR) 10 MG Tab Take 10 mg by mouth every day.     • montelukast (SINGULAIR) 10 MG Tab Take 1 Tab by mouth every day. 30 Tab 12     No current facility-administered medications on file prior to visit.      Ciprofloxacin; Morphine; Sulfa drugs; Sulfamethoxazole w-trimethoprim; Sulfate; Sulfites; Ventolin [albuterol]; and Amoxicillin      Objective:  /68   LMP 06/15/2017   Breastfeeding? Yes     Breast:No skin, nipple or axillary changes and No breast  masses noted  Abdomen:Soft, nontender, no hernias, masses, or organomegaly  Pelvic:Bladder normal w/o fullness, mass or tenderness, Uterus normal size w/o mass or tenderness, Adnexa w/o enlargement, mass or tenderness, Cervix position- Parous, closed, mobile, no discharge    Birth control:none    Lab:  Recent Results (from the past 1008 hour(s))   Hold Blood Bank Specimen (Not Tested)    Collection Time: 03/17/18  1:30 PM   Result Value Ref Range    Holding Tube - Bb DONE    CBC WITH DIFFERENTIAL    Collection Time: 03/17/18  1:30 PM   Result Value Ref Range    WBC 19.7 (H) 4.8 - 10.8 K/uL    RBC 4.19 (L) 4.20 - 5.40 M/uL    Hemoglobin 12.8 12.0 - 16.0 g/dL    Hematocrit 38.0 37.0 - 47.0 %    MCV 90.7 81.4 - 97.8 fL    MCH 30.5 27.0 - 33.0 pg    MCHC 33.7 33.6 - 35.0 g/dL    RDW 42.1 35.9 - 50.0 fL    Platelet Count 291 164 - 446 K/uL    MPV 11.0 9.0 - 12.9 fL    Neutrophils-Polys 71.70 44.00 - 72.00 %    Lymphocytes 19.40 (L) 22.00 - 41.00 %    Monocytes 7.10 0.00 - 13.40 %    Eosinophils 0.60 0.00 - 6.90 %    Basophils 0.60 0.00 - 1.80 %    Immature Granulocytes 0.60 0.00 - 0.90 %    Nucleated RBC 0.00 /100 WBC    Neutrophils (Absolute) 14.17 (H) 2.00 - 7.15 K/uL    Lymphs (Absolute) 3.82 1.00 - 4.80 K/uL    Monos (Absolute) 1.40 (H) 0.00 - 0.85 K/uL    Eos (Absolute) 0.12 0.00 - 0.51 K/uL    Baso (Absolute) 0.11 0.00 - 0.12 K/uL    Immature Granulocytes (abs) 0.12 (H) 0.00 - 0.11 K/uL    NRBC (Absolute) 0.00 K/uL   CBC without differential    Collection Time: 03/18/18  5:30 AM   Result Value Ref Range    WBC 20.8 (H) 4.8 - 10.8 K/uL    RBC 3.46 (L) 4.20 - 5.40 M/uL    Hemoglobin 10.3 (L) 12.0 - 16.0 g/dL    Hematocrit 31.6 (L) 37.0 - 47.0 %    MCV 91.3 81.4 - 97.8 fL    MCH 29.8 27.0 - 33.0 pg    MCHC 32.6 (L) 33.6 - 35.0 g/dL    RDW 42.8 35.9 - 50.0 fL    Platelet Count 244 164 - 446 K/uL    MPV 11.2 9.0 - 12.9 fL       Assessment:  normal postpartum course and good candidate for oral progesterone-only  contraceptive  plans to breastfeed  6 months

## 2018-07-05 RX ORDER — LEVONORGESTREL AND ETHINYL ESTRADIOL 0.1-0.02MG
1 KIT ORAL DAILY
Qty: 84 TAB | Refills: 4 | Status: SHIPPED | OUTPATIENT
Start: 2018-07-05 | End: 2018-07-05 | Stop reason: SDUPTHER

## 2018-07-05 RX ORDER — LEVONORGESTREL AND ETHINYL ESTRADIOL 0.1-0.02MG
1 KIT ORAL DAILY
Qty: 84 TAB | Refills: 4 | Status: SHIPPED | OUTPATIENT
Start: 2018-07-05 | End: 2019-04-10

## 2018-09-16 ENCOUNTER — PATIENT MESSAGE (OUTPATIENT)
Dept: MEDICAL GROUP | Facility: MEDICAL CENTER | Age: 28
End: 2018-09-16

## 2018-09-16 DIAGNOSIS — Z91.09 ALLERGY TO ENVIRONMENTAL FACTORS: ICD-10-CM

## 2018-09-16 DIAGNOSIS — Z91.018 FOOD ALLERGY: ICD-10-CM

## 2018-11-21 ENCOUNTER — PATIENT MESSAGE (OUTPATIENT)
Dept: MEDICAL GROUP | Facility: MEDICAL CENTER | Age: 28
End: 2018-11-21

## 2018-11-21 DIAGNOSIS — K12.0 CANKER SORES ORAL: ICD-10-CM

## 2018-11-21 RX ORDER — LIDOCAINE HYDROCHLORIDE 20 MG/ML
1 SOLUTION OROPHARYNGEAL 4 TIMES DAILY PRN
Qty: 1 BOTTLE | Refills: 0 | Status: SHIPPED | OUTPATIENT
Start: 2018-11-21 | End: 2018-11-26

## 2019-03-13 ENCOUNTER — HOSPITAL ENCOUNTER (OUTPATIENT)
Facility: MEDICAL CENTER | Age: 29
End: 2019-03-13
Attending: NURSE PRACTITIONER
Payer: COMMERCIAL

## 2019-03-13 ENCOUNTER — OFFICE VISIT (OUTPATIENT)
Dept: URGENT CARE | Facility: PHYSICIAN GROUP | Age: 29
End: 2019-03-13
Payer: COMMERCIAL

## 2019-03-13 VITALS
HEART RATE: 95 BPM | TEMPERATURE: 99 F | DIASTOLIC BLOOD PRESSURE: 82 MMHG | WEIGHT: 150 LBS | SYSTOLIC BLOOD PRESSURE: 106 MMHG | HEIGHT: 71 IN | OXYGEN SATURATION: 100 % | BODY MASS INDEX: 21 KG/M2

## 2019-03-13 DIAGNOSIS — N89.8 VAGINAL DISCHARGE: ICD-10-CM

## 2019-03-13 DIAGNOSIS — M54.50 ACUTE LEFT-SIDED LOW BACK PAIN WITHOUT SCIATICA: ICD-10-CM

## 2019-03-13 DIAGNOSIS — B96.89 BV (BACTERIAL VAGINOSIS): ICD-10-CM

## 2019-03-13 DIAGNOSIS — N76.0 BV (BACTERIAL VAGINOSIS): ICD-10-CM

## 2019-03-13 LAB
APPEARANCE UR: NORMAL
BILIRUB UR STRIP-MCNC: NEGATIVE MG/DL
CANDIDA DNA VAG QL PROBE+SIG AMP: NEGATIVE
COLOR UR AUTO: NORMAL
G VAGINALIS DNA VAG QL PROBE+SIG AMP: POSITIVE
GLUCOSE UR STRIP.AUTO-MCNC: NEGATIVE MG/DL
KETONES UR STRIP.AUTO-MCNC: NEGATIVE MG/DL
LEUKOCYTE ESTERASE UR QL STRIP.AUTO: NORMAL
NITRITE UR QL STRIP.AUTO: NEGATIVE
PH UR STRIP.AUTO: 5.5 [PH] (ref 5–8)
PROT UR QL STRIP: 30 MG/DL
RBC UR QL AUTO: NEGATIVE
SP GR UR STRIP.AUTO: 1.02
T VAGINALIS DNA VAG QL PROBE+SIG AMP: NEGATIVE
UROBILINOGEN UR STRIP-MCNC: 0.2 MG/DL

## 2019-03-13 PROCEDURE — 81002 URINALYSIS NONAUTO W/O SCOPE: CPT | Performed by: NURSE PRACTITIONER

## 2019-03-13 PROCEDURE — 87510 GARDNER VAG DNA DIR PROBE: CPT

## 2019-03-13 PROCEDURE — 87660 TRICHOMONAS VAGIN DIR PROBE: CPT

## 2019-03-13 PROCEDURE — 99214 OFFICE O/P EST MOD 30 MIN: CPT | Performed by: NURSE PRACTITIONER

## 2019-03-13 PROCEDURE — 87480 CANDIDA DNA DIR PROBE: CPT

## 2019-03-13 RX ORDER — CYCLOBENZAPRINE HCL 5 MG
5 TABLET ORAL 3 TIMES DAILY PRN
Qty: 21 TAB | Refills: 0 | Status: SHIPPED | OUTPATIENT
Start: 2019-03-13 | End: 2019-03-20

## 2019-03-13 RX ORDER — METRONIDAZOLE 500 MG/1
500 TABLET ORAL
Qty: 10 TAB | Refills: 0 | Status: SHIPPED | OUTPATIENT
Start: 2019-03-13 | End: 2019-03-18

## 2019-03-13 NOTE — PROGRESS NOTES
"Subjective:      Bobbi Beltran is a 28 y.o. female who presents with Back Pain ([id number] low back pain, abd pain)            HPI  States \"possibly bacterial vaginosis\", states vaginal d/c, odorous x 3 months. Denies vaginal irritation or itching. LMP, ended 2 days ago. \"Normal\" bleeding. C/o left sided low back pain x 3 months. Requesting flexeril. Has appt with [facility] tomorrow. Admits to \"waiting\" to go to the doctor when she is sick as she does not like to go until \"I am really sick\". Denies fever, n/v. Has mid abdominal \"soreness\" with movement. Recently had a child last year. States in a monogamous heterosexual relationship with , denies STI history or exposure.    PMH:  has a past medical history of Herpes.  MEDS:   Current Outpatient Prescriptions:   •  levonorgestrel-ethinyl estradiol (AVIANE, ALESSE, LESSINA) 0.1-20 MG-MCG per tablet, Take 1 Tab by mouth every day., Disp: 84 Tab, Rfl: 4  •  ibuprofen (MOTRIN) 800 MG Tab, Take 1 Tab by mouth every 8 hours as needed (For cramping after delivery; do not give if patient is receiving ketorolac (Toradol)). (Patient not taking: Reported on 3/13/2019), Disp: 30 Tab, Rfl: 1  •  docusate sodium 100 MG Cap, Take 100 mg by mouth 2 times a day as needed for Constipation. (Patient not taking: Reported on 3/13/2019), Disp: 60 Cap, Rfl: 0  •  ferrous sulfate 325 (65 Fe) MG EC tablet, Take 1 Tab by mouth every day. (Patient not taking: Reported on 3/13/2019), Disp: 30 Tab, Rfl: 2  •  fluticasone (FLONASE) 50 MCG/ACT nasal spray, Spray 1 Spray in nose every day., Disp: , Rfl:   •  valACYclovir (VALTREX) 500 MG Tab, Take 500 mg by mouth 2 times a day., Disp: , Rfl:   •  Doxylamine Succinate, Sleep, (UNISOM PO), Take  by mouth., Disp: , Rfl:   •  Prenatal MV-Min-Fe Fum-FA-DHA (PRENATAL 1 PO), Take  by mouth., Disp: , Rfl:   •  montelukast (SINGULAIR) 10 MG Tab, Take 1 Tab by mouth every day. (Patient not taking: Reported on 3/13/2019), Disp: 30 Tab, Rfl: " "12  ALLERGIES:   Allergies   Allergen Reactions   • Ciprofloxacin      nausea/vomiting   • Morphine    • Sulfa Drugs Anaphylaxis   • Sulfamethoxazole W-Trimethoprim Swelling   • Sulfate Anaphylaxis   • Sulfites Anaphylaxis   • Ventolin [Albuterol]      Albuterol-sulfate   • Amoxicillin Rash     Rash       SURGHX:   Past Surgical History:   Procedure Laterality Date   • OTHER Right 01/01/2014    fibroid removal right breast   • OTHER  01/01/2008    wisdom teeth extraction     SOCHX:  reports that she has never smoked. She has never used smokeless tobacco. She reports that she does not drink alcohol or use drugs.  FH: Family history was reviewed, no pertinent findings to report    Review of Systems   Constitutional: Negative for chills, fever and malaise/fatigue.   Respiratory: Negative for shortness of breath.    Cardiovascular: Negative for chest pain, palpitations and orthopnea.   Gastrointestinal: Positive for abdominal pain. Negative for constipation, diarrhea, nausea and vomiting.   Genitourinary: Negative for dysuria, flank pain, frequency, hematuria and urgency.   Musculoskeletal: Positive for back pain and myalgias.   Neurological: Negative for dizziness, weakness and headaches.   All other systems reviewed and are negative.         Objective:     /82   Pulse 95   Temp 37.2 °C (99 °F) (Temporal)   Ht 1.803 m (5' 11\")   Wt 68 kg (150 lb)   SpO2 100%   BMI 20.92 kg/m²      Physical Exam   Constitutional: She is oriented to person, place, and time. She appears well-developed and well-nourished. She is active and cooperative.  Non-toxic appearance. She does not have a sickly appearance. She does not appear ill. No distress.   HENT:   Head: Normocephalic.   Eyes: Pupils are equal, round, and reactive to light. Conjunctivae and EOM are normal.   Neck: Normal range of motion. Neck supple.   Cardiovascular: Normal rate.    Pulmonary/Chest: Effort normal.   Abdominal: Soft. Bowel sounds are normal. She " exhibits no distension and no fluid wave. There is no hepatosplenomegaly. There is no tenderness. There is no rigidity, no rebound, no guarding, no CVA tenderness, no tenderness at McBurney's point and negative Sandy's sign. No hernia.       Musculoskeletal:        Lumbar back: She exhibits decreased range of motion, tenderness and pain. She exhibits no bony tenderness, no swelling, no edema, no deformity, no laceration and no spasm.        Back:    Neurological: She is alert and oriented to person, place, and time.   Skin: Skin is warm and dry. She is not diaphoretic.   Vitals reviewed.              Assessment/Plan:     1. Vaginal discharge    - VAGINAL PATHOGENS DNA PANEL; Future    2. BV (bacterial vaginosis)    - metroNIDAZOLE (FLAGYL) 500 MG Tab; Take 1 Tab by mouth 2 Times a Day for 5 days.  Dispense: 10 Tab; Refill: 0    3. Acute left-sided low back pain without sciatica    - cyclobenzaprine (FLEXERIL) 5 MG tablet; Take 1 Tab by mouth 3 times a day as needed for Muscle Spasms for up to 7 days. Causes drowsiness, do not drive or work while using.  Dispense: 21 Tab; Refill: 0  - POCT Urinalysis    Declines GC/Chlamydia testing at this time  Increase water intake  Urinate more frequently and empty bladder completely  Practice good toileting hygiene after bowel movements and sexual intercourse, refrain from sexual intercourse until infection cleared  Monitor for back/flank pain, difficulty urinating, blood in urine- need re-evaluation  Keep appt with Gynecologist tomorrow    MyChart release for vaginal cultures, patient notified of this

## 2019-03-14 ENCOUNTER — GYNECOLOGY VISIT (OUTPATIENT)
Dept: OBGYN | Facility: CLINIC | Age: 29
End: 2019-03-14
Payer: COMMERCIAL

## 2019-03-14 VITALS
SYSTOLIC BLOOD PRESSURE: 124 MMHG | BODY MASS INDEX: 22.96 KG/M2 | DIASTOLIC BLOOD PRESSURE: 80 MMHG | WEIGHT: 164 LBS | HEIGHT: 71 IN

## 2019-03-14 DIAGNOSIS — A60.00 GENITAL HERPES SIMPLEX, UNSPECIFIED SITE: ICD-10-CM

## 2019-03-14 DIAGNOSIS — N89.8 VAGINAL DISCHARGE: ICD-10-CM

## 2019-03-14 PROBLEM — R73.02 IMPAIRED GLUCOSE TOLERANCE (ORAL): Status: RESOLVED | Noted: 2018-01-26 | Resolved: 2019-03-14

## 2019-03-14 PROBLEM — Z34.03 ENCOUNTER FOR SUPERVISION OF NORMAL FIRST PREGNANCY IN THIRD TRIMESTER: Status: RESOLVED | Noted: 2017-09-14 | Resolved: 2019-03-14

## 2019-03-14 PROCEDURE — 99214 OFFICE O/P EST MOD 30 MIN: CPT | Performed by: OBSTETRICS & GYNECOLOGY

## 2019-03-14 ASSESSMENT — ENCOUNTER SYMPTOMS
SHORTNESS OF BREATH: 0
BACK PAIN: 1
NAUSEA: 0
PALPITATIONS: 0
HEADACHES: 0
DIZZINESS: 0
CHILLS: 0
DIARRHEA: 0
ORTHOPNEA: 0
WEAKNESS: 0
ABDOMINAL PAIN: 1
MYALGIAS: 1
CONSTIPATION: 0
FEVER: 0
FLANK PAIN: 0
VOMITING: 0

## 2019-03-14 NOTE — NON-PROVIDER
Pt her complaining of abdominal and back pain, states vaginal discharge w/ odor no ithing since 01/19. went to the ER, was Rx antibiotics. Pt thinks she might have a kidney infection

## 2019-03-14 NOTE — PROGRESS NOTES
GYN visit  CC: abnormal vaginal discharge      Bobbi Beltran is a 28 y.o.  who presents to establish care.  She reports that back in January she was suspicious that she may have bacterial vaginosis.  She didn't have any itching but noticed a smell.  She didn't seek care but took probiotics.  It seemed to get better.  She then went to urgent care yesterday for back pain and she mentioned that she thought she may have bacterial vaginosis.  Vaginal swab was obtained and she was started on metronidazole.  She has been taking the antibiotic and was told to follow-up here.  She reports that she is in a monogamous relationship and has no concern about STDs.  Does not want to be tested for gonorrhea chlamydia.    Has also had some back pain in her midback on her left side.  This is been present for the past couple of weeks.  She thought that she may be twisted her back initially but now she is concerned that she might have a kidney issue.  She denies any dysuria, foul-smelling urine, blood in her urine.  She discussed this with providers at urgent care yesterday and was given a prescription for Flexeril.  Reports she tried this and this being her feel more relaxed but she is unsure if it helped her left-sided back pain.     Denies any other complaints today.    GYN History:  LMP 3/5/19 Menarche @ 12.  Menses regular, lasting 5-6 days, not particularly heavy or painful.  Last pap 10/2017,  no h/o abnormal pap.  No history of cone biopsy, LEEP or any other cervical, uterine or gynecologic surgery. H/o Chlamydia a long time ago, negative CIPRIANO, and HSV2 not on meds with no outbreak for over a year.  Currently sexually active with .  Utilizing OCPs for contraception and has used nothing in the past.     OB History:    OB History    Para Term  AB Living   1 1 1     1   SAB TAB Ectopic Molar Multiple Live Births             1      # Outcome Date GA Lbr Nathen/2nd Weight Sex Delivery Anes PTL Lv   1  Term 03/17/18 39w2d  3.57 kg (7 lb 13.9 oz) F Vag-Spont   ANAHY        Review of Systems:   Pertinent positives documented in HPI and all other systems reviewed & are negative.    All PMH, PSH, allergies, social history and FH reviewed and updated today:  Past Medical History:  Past Medical History:   Diagnosis Date   • Herpes        Past Surgical History:  Past Surgical History:   Procedure Laterality Date   • OTHER Right 01/01/2014    fibroid removal right breast   • OTHER  01/01/2008    wisdom teeth extraction     Medications:   Current Outpatient Prescriptions Ordered in [facility]   Medication Sig Dispense Refill   • metroNIDAZOLE (FLAGYL) 500 MG Tab Take 1 Tab by mouth 2 Times a Day for 5 days. 10 Tab 0   • cyclobenzaprine (FLEXERIL) 5 MG tablet Take 1 Tab by mouth 3 times a day as needed for Muscle Spasms for up to 7 days. Causes drowsiness, do not drive or work while using. 21 Tab 0   • levonorgestrel-ethinyl estradiol (AVIANE, ALESSE, LESSINA) 0.1-20 MG-MCG per tablet Take 1 Tab by mouth every day. 84 Tab 4   • fluticasone (FLONASE) 50 MCG/ACT nasal spray Spray 1 Spray in nose every day.     • valACYclovir (VALTREX) 500 MG Tab Take 500 mg by mouth 2 times a day.     • Doxylamine Succinate, Sleep, (UNISOM PO) Take  by mouth.     • Prenatal MV-Min-Fe Fum-FA-DHA (PRENATAL 1 PO) Take  by mouth.       No current Epic-ordered facility-administered medications on file.        Allergies: Ciprofloxacin; Macrobid [nitrofurantoin]; Morphine; Sulfa drugs; Sulfamethoxazole w-trimethoprim; Sulfate; Sulfites; Ventolin [albuterol]; and Amoxicillin    Social History:  Social History     Social History   • Marital status:      Spouse name: N/A   • Number of children: N/A   • Years of education: N/A     Occupational History   • pharmacy tech      Social History Main Topics   • Smoking status: Never Smoker   • Smokeless tobacco: Never Used   • Alcohol use No   • Drug use: No   • Sexual activity: Yes     Partners: Male     Other  "Topics Concern   • Not on file     Social History Narrative   • No narrative on file       Family History:  No family history on file.        Objective:   Vitals:  Blood pressure 124/80, height 1.803 m (5' 11\"), weight 74.4 kg (164 lb), currently breastfeeding.  Body mass index is 22.87 kg/m². (Goal BM I>18 <25)    Physical Exam:   Nursing note and vitals reviewed.  Physical Exam   GENERAL: No acute distress  HENT: Atraumatic, normocephalic  EYES: Extraocular movements intact, pupils equal and reactive to light  NECK: Supple, Full ROM  CHEST: No deformities, Equal chest expansion  RESP: Unlabored, no stridor or audible wheeze  ABD: Soft, Nontender, Non-Distended  Back: no CVA tenderness  Extremities: No Clubbing, Cyanosis, or Edema  Skin: Warm/dry, without rases  Neuro: A/O x 4, CN 2-12 Grossly intact, Motor/sensory grossly intact  Psych: Normal mood, behavior, and affect  Genitourinary: declined     Assessment/Plan:     1. Genital herpes simplex, unspecified site     2. Vaginal discharge         Bobbi Beltran is a 28 y.o.  female who presents as a new patient with vaginal discharge.   #Vaginal discharge.  Swab was obtained yesterday and results are pending.  Patient has Mayur started taking metronidazole.  Discussed that we can await results from swab obtained yesterday but there is no point in repeating the swab today.  She reports that she otherwise has no concerns and therefore does not want exam today.  If symptoms do not improve after completing course of metronidazole she will contact our office.   #History of HSV-2.  Patient denies any outbreaks for the past year.  If has an outbreak or has concerns advised her to contact us for an exam.   #Cervical cancer screening.  Patient has no history of abnormal Paps and last Pap was performed in 2017 therefore repeat Pap not due until   #Back pain.  Likely musculoskeletal in nature.  Urine dip negative and patient has no urinary symptoms.  Discussed " genitourinary physiology with patient.  Advised ibuprofen and Flexeril as necessary.  If worsens or does not improve advise she see PCP or present to urgent care/ER.    Patient was seen for 30 minutes of which > 50% of appointment time was spent on face-to-face counseling and coordination of care regarding the above.

## 2019-04-10 DIAGNOSIS — Z30.41 ENCOUNTER FOR SURVEILLANCE OF CONTRACEPTIVE PILLS: ICD-10-CM

## 2019-04-10 RX ORDER — LEVONORGESTREL AND ETHINYL ESTRADIOL 0.15-0.03
1 KIT ORAL DAILY
Qty: 84 TAB | Refills: 3 | Status: SHIPPED | OUTPATIENT
Start: 2019-04-10

## 2019-04-15 ENCOUNTER — PATIENT MESSAGE (OUTPATIENT)
Dept: MEDICAL GROUP | Facility: MEDICAL CENTER | Age: 29
End: 2019-04-15

## 2019-04-15 DIAGNOSIS — J30.2 SEASONAL ALLERGIES: ICD-10-CM

## 2019-04-15 RX ORDER — MONTELUKAST SODIUM 10 MG/1
10 TABLET ORAL DAILY
Qty: 30 TAB | Refills: 0 | Status: SHIPPED | OUTPATIENT
Start: 2019-04-15 | End: 2019-05-08 | Stop reason: SDUPTHER

## 2019-05-08 DIAGNOSIS — J30.2 SEASONAL ALLERGIES: ICD-10-CM

## 2019-05-08 RX ORDER — MONTELUKAST SODIUM 10 MG/1
TABLET ORAL
Qty: 30 TAB | Refills: 0 | Status: SHIPPED | OUTPATIENT
Start: 2019-05-08 | End: 2019-08-14 | Stop reason: SDUPTHER

## 2019-06-07 ENCOUNTER — PATIENT MESSAGE (OUTPATIENT)
Dept: MEDICAL GROUP | Facility: MEDICAL CENTER | Age: 29
End: 2019-06-07

## 2019-06-07 DIAGNOSIS — J45.21 MILD INTERMITTENT EXTRINSIC ASTHMA WITH ACUTE EXACERBATION: ICD-10-CM

## 2019-06-10 RX ORDER — LEVALBUTEROL TARTRATE 45 UG/1
1-2 AEROSOL, METERED ORAL EVERY 4 HOURS PRN
Qty: 1 INHALER | Refills: 3 | Status: SHIPPED | OUTPATIENT
Start: 2019-06-10 | End: 2019-08-14 | Stop reason: SDUPTHER

## 2019-08-08 DIAGNOSIS — A60.9 HSV (HERPES SIMPLEX VIRUS) ANOGENITAL INFECTION: ICD-10-CM

## 2019-08-08 RX ORDER — ACYCLOVIR 200 MG/1
200 CAPSULE ORAL
Qty: 25 CAP | Refills: 0 | Status: SHIPPED | OUTPATIENT
Start: 2019-08-08 | End: 2019-08-13

## 2019-08-14 ENCOUNTER — OFFICE VISIT (OUTPATIENT)
Dept: MEDICAL GROUP | Facility: MEDICAL CENTER | Age: 29
End: 2019-08-14
Payer: COMMERCIAL

## 2019-08-14 VITALS
RESPIRATION RATE: 14 BRPM | WEIGHT: 158.29 LBS | BODY MASS INDEX: 22.16 KG/M2 | HEART RATE: 74 BPM | HEIGHT: 71 IN | SYSTOLIC BLOOD PRESSURE: 122 MMHG | TEMPERATURE: 97.4 F | DIASTOLIC BLOOD PRESSURE: 84 MMHG | OXYGEN SATURATION: 96 %

## 2019-08-14 DIAGNOSIS — J45.21 MILD INTERMITTENT EXTRINSIC ASTHMA WITH ACUTE EXACERBATION: ICD-10-CM

## 2019-08-14 DIAGNOSIS — A60.00 RECURRENT GENITAL HERPES: ICD-10-CM

## 2019-08-14 DIAGNOSIS — R52 PAIN: ICD-10-CM

## 2019-08-14 DIAGNOSIS — Z87.892 HISTORY OF ANAPHYLAXIS: ICD-10-CM

## 2019-08-14 DIAGNOSIS — F41.9 ANXIETY: ICD-10-CM

## 2019-08-14 DIAGNOSIS — J30.2 SEASONAL ALLERGIES: ICD-10-CM

## 2019-08-14 PROBLEM — J45.909 EXTRINSIC ASTHMA: Status: ACTIVE | Noted: 2019-08-14

## 2019-08-14 PROCEDURE — 99214 OFFICE O/P EST MOD 30 MIN: CPT | Performed by: PHYSICIAN ASSISTANT

## 2019-08-14 RX ORDER — TRAMADOL HYDROCHLORIDE 50 MG/1
50 TABLET ORAL EVERY 4 HOURS PRN
Qty: 15 TAB | Refills: 0 | Status: SHIPPED | OUTPATIENT
Start: 2019-08-14 | End: 2019-08-17

## 2019-08-14 RX ORDER — DICLOFENAC POTASSIUM 50 MG/1
TABLET, FILM COATED ORAL
Refills: 0 | COMMUNITY
Start: 2019-08-02 | End: 2019-08-14 | Stop reason: SDUPTHER

## 2019-08-14 RX ORDER — LEVOCETIRIZINE DIHYDROCHLORIDE 5 MG/1
1 TABLET, FILM COATED ORAL
Qty: 30 TAB | Refills: 11 | Status: SHIPPED | OUTPATIENT
Start: 2019-08-14

## 2019-08-14 RX ORDER — EPINEPHRINE 0.3 MG/.3ML
0.3 INJECTION SUBCUTANEOUS ONCE
Qty: 0.3 ML | Refills: 5 | Status: SHIPPED | OUTPATIENT
Start: 2019-08-14 | End: 2019-08-14

## 2019-08-14 RX ORDER — VALACYCLOVIR HYDROCHLORIDE 500 MG/1
500 TABLET, FILM COATED ORAL 2 TIMES DAILY
Qty: 30 TAB | Refills: 11 | Status: SHIPPED | OUTPATIENT
Start: 2019-08-14

## 2019-08-14 RX ORDER — MONTELUKAST SODIUM 10 MG/1
10 TABLET ORAL
Qty: 30 TAB | Refills: 11 | Status: SHIPPED | OUTPATIENT
Start: 2019-08-14

## 2019-08-14 RX ORDER — LEVALBUTEROL TARTRATE 45 UG/1
1-2 AEROSOL, METERED ORAL EVERY 4 HOURS PRN
Qty: 1 INHALER | Refills: 11 | Status: SHIPPED | OUTPATIENT
Start: 2019-08-14

## 2019-08-14 RX ORDER — FLUTICASONE PROPIONATE 50 MCG
1 SPRAY, SUSPENSION (ML) NASAL DAILY
Qty: 1 BOTTLE | Refills: 6 | Status: SHIPPED | OUTPATIENT
Start: 2019-08-14

## 2019-08-14 RX ORDER — PREDNISONE 20 MG/1
20 TABLET ORAL 2 TIMES DAILY
Qty: 20 TAB | Refills: 3 | Status: SHIPPED | OUTPATIENT
Start: 2019-08-14 | End: 2019-08-19

## 2019-08-14 RX ORDER — BUSPIRONE HYDROCHLORIDE 7.5 MG/1
7.5 TABLET ORAL 3 TIMES DAILY
Qty: 90 TAB | Refills: 0 | Status: SHIPPED | OUTPATIENT
Start: 2019-08-14 | End: 2019-09-13

## 2019-08-14 RX ORDER — DICLOFENAC POTASSIUM 50 MG/1
50 TABLET, FILM COATED ORAL 2 TIMES DAILY PRN
Qty: 60 TAB | Refills: 3 | Status: SHIPPED | OUTPATIENT
Start: 2019-08-14 | End: 2019-12-20 | Stop reason: SDUPTHER

## 2019-08-14 RX ORDER — PREDNISONE 20 MG/1
TABLET ORAL
Refills: 0 | COMMUNITY
Start: 2019-06-07 | End: 2019-08-14 | Stop reason: SDUPTHER

## 2019-08-14 RX ORDER — LEVOCETIRIZINE DIHYDROCHLORIDE 5 MG/1
1 TABLET, FILM COATED ORAL
Refills: 1 | COMMUNITY
Start: 2019-08-02 | End: 2019-08-14 | Stop reason: SDUPTHER

## 2019-08-14 NOTE — ASSESSMENT & PLAN NOTE
"Since the birth of her daughter. Felt anxious to take her daughter out. Stresses her out when she is crying or \"making a scene\". 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 Last week had \"break down\". Had had some anxiety prior to having her daughter but it is much worse now. Living with roommates. Doesn't have time to go to therapy. Would like to try medication  "

## 2019-08-15 NOTE — PROGRESS NOTES
"Subjective:   Bobbi Beltran is a 28 y.o. female here today for anxiety and medication refills    Anxiety  Since the birth of her daughter. Felt anxious to take her daughter out. Stresses her out when she is crying or \"making a scene\". 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 Last week had \"break down\". Had had some anxiety prior to having her daughter but it is much worse now. Living with roommates. Doesn't have time to go to therapy. Would like to try medication    Extrinsic asthma  Due for refills on inhalers, triggered by allergies    Recurrent genital herpes  Needs acyclovir refilled, triggered by stress, would also like to have a medication for pain for prn use    Seasonal allergies  Needs refills on medication       Current medicines (including changes today)  Current Outpatient Medications   Medication Sig Dispense Refill   • montelukast (SINGULAIR) 10 MG Tab Take 1 Tab by mouth every day. 30 Tab 11   • levalbuterol (XOPENEX HFA) 45 MCG/ACT inhaler Inhale 1-2 Puffs by mouth every four hours as needed for Shortness of Breath. 1 Inhaler 11   • valACYclovir (VALTREX) 500 MG Tab Take 1 Tab by mouth 2 times a day. 30 Tab 11   • fluticasone (FLONASE) 50 MCG/ACT nasal spray Spray 1 Spray in nose every day. 1 Bottle 6   • Levocetirizine Dihydrochloride 5 MG Tab Take 1 Tab by mouth every day. N THE EVENING 30 Tab 11   • diclofenac (CATAFLAM) 50 MG tablet Take 1 Tab by mouth 2 times a day as needed. 60 Tab 3   • predniSONE (DELTASONE) 20 MG Tab Take 1 Tab by mouth 2 times a day for 5 days. 20 Tab 3   • busPIRone (BUSPAR) 7.5 MG tablet Take 1 Tab by mouth 3 times a day for 30 days. 90 Tab 0   • tramadol (ULTRAM) 50 MG Tab Take 1 Tab by mouth every four hours as needed for up to 3 days. 15 Tab 0   • levonorgestrel-ethinyl estradiol (SEASONALE) 0.15-0.03 MG per tablet Take 1 Tab by mouth every day. 84 Tab 3   • Doxylamine Succinate, Sleep, (UNISOM " "PO) Take  by mouth.     • Prenatal MV-Min-Fe Fum-FA-DHA (PRENATAL 1 PO) Take  by mouth.       No current facility-administered medications for this visit.      She  has a past medical history of Herpes.    ROS   No fever/chills. No weight change. No headache/dizziness. No focal weakness. No chest pain, no shortness of breath, difficulty breathing. No n/v/d/c or abdominal pain. No urinary complaint. No rash or skin lesion. No joint pain or swelling.     Objective:     /84 (BP Location: Left arm, Patient Position: Sitting, BP Cuff Size: Adult)   Pulse 74   Temp 36.3 °C (97.4 °F) (Temporal)   Resp 14   Ht 1.803 m (5' 11\")   Wt 71.8 kg (158 lb 4.6 oz)   SpO2 96%  Body mass index is 22.08 kg/m².   Physical Exam:  Constitutional: WDWN, NAD  Skin: Warm, dry, good turgor, no rashes in visible areas.  Respiratory: Unlabored respiratory effort, lungs clear to auscultation, no wheezes, no ronchi.  Cardiovascular: Normal S1, S2, no murmur, no leg edema.  Psych: Alert and oriented x3, normal affect and mood.    Assessment and Plan:   The following treatment plan was discussed    1. Mild intermittent extrinsic asthma with acute exacerbation    - levalbuterol (XOPENEX HFA) 45 MCG/ACT inhaler; Inhale 1-2 Puffs by mouth every four hours as needed for Shortness of Breath.  Dispense: 1 Inhaler; Refill: 11  - predniSONE (DELTASONE) 20 MG Tab; Take 1 Tab by mouth 2 times a day for 5 days.  Dispense: 20 Tab; Refill: 3    2. Seasonal allergies    - montelukast (SINGULAIR) 10 MG Tab; Take 1 Tab by mouth every day.  Dispense: 30 Tab; Refill: 11  - fluticasone (FLONASE) 50 MCG/ACT nasal spray; Spray 1 Spray in nose every day.  Dispense: 1 Bottle; Refill: 6  - Levocetirizine Dihydrochloride 5 MG Tab; Take 1 Tab by mouth every day. N THE EVENING  Dispense: 30 Tab; Refill: 11    3. Anxiety  Risk/benefit and potential side effects of medication discussed, f/u 2-4 weeks  - busPIRone (BUSPAR) 7.5 MG tablet; Take 1 Tab by mouth 3 times " a day for 30 days.  Dispense: 90 Tab; Refill: 0    4. Recurrent genital herpes    - valACYclovir (VALTREX) 500 MG Tab; Take 1 Tab by mouth 2 times a day.  Dispense: 30 Tab; Refill: 11  - tramadol (ULTRAM) 50 MG Tab; Take 1 Tab by mouth every four hours as needed for up to 3 days.  Dispense: 15 Tab; Refill: 0    5. Pain    - diclofenac (CATAFLAM) 50 MG tablet; Take 1 Tab by mouth 2 times a day as needed.  Dispense: 60 Tab; Refill: 3    6. History of anaphylaxis    - EPINEPHrine (EPIPEN) 0.3 MG/0.3ML Solution Auto-injector solution for injection; 0.3 mL by Intramuscular route Once for 1 dose.  Dispense: 0.3 mL; Refill: 5      Followup: 1 month

## 2019-08-15 NOTE — ASSESSMENT & PLAN NOTE
Needs acyclovir refilled, triggered by stress, would also like to have a medication for pain for prn use

## 2019-08-19 ENCOUNTER — PATIENT MESSAGE (OUTPATIENT)
Dept: MEDICAL GROUP | Facility: MEDICAL CENTER | Age: 29
End: 2019-08-19

## 2019-08-19 DIAGNOSIS — F41.9 ANXIETY: ICD-10-CM

## 2019-08-20 RX ORDER — SERTRALINE HYDROCHLORIDE 25 MG/1
TABLET, FILM COATED ORAL
Qty: 60 TAB | Refills: 0 | Status: SHIPPED | OUTPATIENT
Start: 2019-08-20 | End: 2019-09-05 | Stop reason: SINTOL

## 2019-09-04 ENCOUNTER — PATIENT MESSAGE (OUTPATIENT)
Dept: MEDICAL GROUP | Facility: MEDICAL CENTER | Age: 29
End: 2019-09-04

## 2019-09-04 DIAGNOSIS — F41.9 ANXIETY: ICD-10-CM

## 2019-09-05 RX ORDER — ESCITALOPRAM OXALATE 10 MG/1
10 TABLET ORAL DAILY
Qty: 30 TAB | Refills: 3 | Status: SHIPPED | OUTPATIENT
Start: 2019-09-05 | End: 2019-11-13 | Stop reason: SDUPTHER

## 2019-09-26 ENCOUNTER — PATIENT MESSAGE (OUTPATIENT)
Dept: MEDICAL GROUP | Facility: MEDICAL CENTER | Age: 29
End: 2019-09-26

## 2019-09-26 RX ORDER — ESCITALOPRAM OXALATE 20 MG/1
20 TABLET ORAL DAILY
Qty: 30 TAB | Refills: 6 | Status: SHIPPED | OUTPATIENT
Start: 2019-09-26

## 2019-09-26 NOTE — TELEPHONE ENCOUNTER
From: Bobbi Beltran  To: Katina Go P.A.-C.  Sent: 9/26/2019 10:28 AM PDT  Subject: Non-Urgent Medical Question    Can we up my lexapro dose? My  and I are going through a separation and some days I’m finding it hard to get out of bed I’m hoping a higher dose might help a little

## 2019-11-13 DIAGNOSIS — F41.9 ANXIETY: ICD-10-CM

## 2019-11-13 RX ORDER — ESCITALOPRAM OXALATE 10 MG/1
10 TABLET ORAL DAILY
Qty: 90 TAB | Refills: 3 | Status: SHIPPED | OUTPATIENT
Start: 2019-11-13

## 2019-11-13 NOTE — TELEPHONE ENCOUNTER
Was the patient seen in the last year in this department? Yes    Does patient have an active prescription for medications requested? Yes    Received Request Via: Pharmacy      Pharmacy requesting a 90 day supply.  Thanks.

## 2019-12-20 DIAGNOSIS — R52 PAIN: ICD-10-CM

## 2019-12-20 RX ORDER — DICLOFENAC POTASSIUM 50 MG/1
50 TABLET, FILM COATED ORAL 2 TIMES DAILY PRN
Qty: 60 TAB | Refills: 3 | Status: SHIPPED | OUTPATIENT
Start: 2019-12-20 | End: 2020-01-22 | Stop reason: SDUPTHER

## 2020-01-22 DIAGNOSIS — R52 PAIN: ICD-10-CM

## 2020-01-22 RX ORDER — DICLOFENAC POTASSIUM 50 MG/1
50 TABLET, FILM COATED ORAL 2 TIMES DAILY PRN
Qty: 60 TAB | Refills: 3 | Status: SHIPPED | OUTPATIENT
Start: 2020-01-22

## 2020-01-22 NOTE — TELEPHONE ENCOUNTER
Was the patient seen in the last year in this department? Yes    Does patient have an active prescription for medications requested? Yes    Received Request Via: Pharmacy     Pt requesting script to be sent to [facility] in [place].  Thanks.